# Patient Record
Sex: MALE | Race: WHITE
[De-identification: names, ages, dates, MRNs, and addresses within clinical notes are randomized per-mention and may not be internally consistent; named-entity substitution may affect disease eponyms.]

---

## 2018-03-09 ENCOUNTER — HOSPITAL ENCOUNTER (OUTPATIENT)
Dept: HOSPITAL 19 - COL.RAD | Age: 83
End: 2018-03-09
Payer: MEDICARE

## 2018-03-09 DIAGNOSIS — K80.20: ICD-10-CM

## 2018-03-09 DIAGNOSIS — K82.8: Primary | ICD-10-CM

## 2018-03-09 PROCEDURE — A9537 TC99M MEBROFENIN: HCPCS

## 2018-05-18 ENCOUNTER — HOSPITAL ENCOUNTER (EMERGENCY)
Dept: HOSPITAL 19 - COL.ER | Age: 83
Discharge: HOME | End: 2018-05-18
Payer: MEDICARE

## 2018-05-18 VITALS — HEART RATE: 85 BPM | DIASTOLIC BLOOD PRESSURE: 79 MMHG | SYSTOLIC BLOOD PRESSURE: 130 MMHG

## 2018-05-18 VITALS — TEMPERATURE: 96.9 F

## 2018-05-18 VITALS — HEIGHT: 70 IN | WEIGHT: 190.48 LBS | BODY MASS INDEX: 27.27 KG/M2

## 2018-05-18 DIAGNOSIS — E78.5: ICD-10-CM

## 2018-05-18 DIAGNOSIS — M54.5: ICD-10-CM

## 2018-05-18 DIAGNOSIS — G89.29: Primary | ICD-10-CM

## 2018-05-18 DIAGNOSIS — Z90.49: ICD-10-CM

## 2018-05-18 DIAGNOSIS — R10.31: ICD-10-CM

## 2018-05-18 DIAGNOSIS — R10.13: ICD-10-CM

## 2018-05-18 DIAGNOSIS — I10: ICD-10-CM

## 2018-05-18 LAB
ALBUMIN SERPL-MCNC: 3.7 GM/DL (ref 3.5–5)
ALP SERPL-CCNC: 96 U/L (ref 50–136)
ALT SERPL-CCNC: 40 U/L (ref 21–72)
AMYLASE SERPL-CCNC: 65 U/L (ref 30–110)
ANION GAP SERPL CALC-SCNC: 11 MMOL/L (ref 7–16)
AST SERPL-CCNC: 30 U/L (ref 15–37)
BASOPHILS # BLD: 0 10*3/UL (ref 0–0.2)
BASOPHILS NFR BLD AUTO: 0.3 % (ref 0–2)
BILIRUB SERPL-MCNC: 1.1 MG/DL (ref 0–1)
BUN SERPL-MCNC: 6 MG/DL (ref 9–20)
CALCIUM SERPL-MCNC: 9.9 MG/DL (ref 8.4–10.2)
CHLORIDE SERPL-SCNC: 100 MMOL/L (ref 98–107)
CO2 SERPL-SCNC: 25 MMOL/L (ref 22–30)
CREAT SERPL-SCNC: 0.92 MG/DL (ref 0.66–1.25)
CRP SERPL-MCNC: < 0.5 MG/DL (ref 0–0.9)
EOSINOPHIL # BLD: 0 10*3/UL (ref 0–0.7)
EOSINOPHIL NFR BLD: 0.3 % (ref 0–4)
ERYTHROCYTE [DISTWIDTH] IN BLOOD BY AUTOMATED COUNT: 14.1 % (ref 11.5–14.5)
GLUCOSE SERPL-MCNC: 102 MG/DL (ref 74–106)
GRANULOCYTES # BLD AUTO: 74.6 % (ref 42.2–75.2)
HCT VFR BLD AUTO: 42.9 % (ref 42–52)
HGB BLD-MCNC: 14.5 G/DL (ref 13.5–18)
INR BLD: 1 (ref 0.8–3)
LIPASE SERPL-CCNC: 73 U/L (ref 23–300)
LYMPHOCYTES # BLD: 1.1 10*3/UL (ref 1.2–3.4)
LYMPHOCYTES NFR BLD: 18.5 % (ref 20–51)
MCH RBC QN AUTO: 31 PG (ref 27–31)
MCHC RBC AUTO-ENTMCNC: 34 G/DL (ref 33–37)
MCV RBC AUTO: 92 FL (ref 80–100)
MONOCYTES # BLD: 0.4 10*3/UL (ref 0.1–0.6)
MONOCYTES NFR BLD AUTO: 6 % (ref 1.7–9.3)
NEUTROPHILS # BLD: 4.5 10*3/UL (ref 1.4–6.5)
PH UR STRIP.AUTO: 6 [PH] (ref 5–8)
PLATELET # BLD AUTO: 112 K/MM3 (ref 130–400)
PMV BLD AUTO: 11.5 FL (ref 7.4–10.4)
POTASSIUM SERPL-SCNC: 3.7 MMOL/L (ref 3.4–5)
PROT SERPL-MCNC: 7.2 GM/DL (ref 6.4–8.2)
PROTHROMBIN TIME: 11.5 SECONDS (ref 9.7–12.8)
RBC # BLD AUTO: 4.65 M/MM3 (ref 4.2–5.6)
RBC # UR: (no result) /HPF
SODIUM SERPL-SCNC: 137 MMOL/L (ref 137–145)
SP GR UR STRIP.AUTO: 1 (ref 1–1.03)
TROPONIN I SERPL-MCNC: < 0.012 NG/ML (ref 0–0.03)
URN COLLECT METHOD CLASS: (no result)

## 2021-12-03 ENCOUNTER — HOSPITAL ENCOUNTER (OUTPATIENT)
Dept: HOSPITAL 19 - SDCO | Age: 86
LOS: 1 days | Discharge: HOME | End: 2021-12-04
Attending: UROLOGY
Payer: MEDICARE

## 2021-12-03 VITALS — HEART RATE: 60 BPM | DIASTOLIC BLOOD PRESSURE: 66 MMHG | SYSTOLIC BLOOD PRESSURE: 142 MMHG

## 2021-12-03 VITALS — DIASTOLIC BLOOD PRESSURE: 58 MMHG | SYSTOLIC BLOOD PRESSURE: 129 MMHG | TEMPERATURE: 98.1 F | HEART RATE: 51 BPM

## 2021-12-03 VITALS — HEART RATE: 69 BPM | SYSTOLIC BLOOD PRESSURE: 123 MMHG | DIASTOLIC BLOOD PRESSURE: 71 MMHG

## 2021-12-03 VITALS — BODY MASS INDEX: 28.9 KG/M2 | HEIGHT: 69.02 IN | WEIGHT: 195.11 LBS

## 2021-12-03 VITALS — SYSTOLIC BLOOD PRESSURE: 132 MMHG | DIASTOLIC BLOOD PRESSURE: 70 MMHG | HEART RATE: 74 BPM

## 2021-12-03 VITALS — DIASTOLIC BLOOD PRESSURE: 67 MMHG | TEMPERATURE: 98.5 F | HEART RATE: 73 BPM | SYSTOLIC BLOOD PRESSURE: 118 MMHG

## 2021-12-03 VITALS — DIASTOLIC BLOOD PRESSURE: 64 MMHG | HEART RATE: 53 BPM | SYSTOLIC BLOOD PRESSURE: 140 MMHG

## 2021-12-03 VITALS — TEMPERATURE: 98.9 F | DIASTOLIC BLOOD PRESSURE: 64 MMHG | HEART RATE: 51 BPM | SYSTOLIC BLOOD PRESSURE: 119 MMHG

## 2021-12-03 VITALS — DIASTOLIC BLOOD PRESSURE: 84 MMHG | SYSTOLIC BLOOD PRESSURE: 132 MMHG | HEART RATE: 76 BPM

## 2021-12-03 VITALS — HEART RATE: 73 BPM | SYSTOLIC BLOOD PRESSURE: 128 MMHG | DIASTOLIC BLOOD PRESSURE: 82 MMHG | TEMPERATURE: 98.2 F

## 2021-12-03 VITALS — HEART RATE: 50 BPM | DIASTOLIC BLOOD PRESSURE: 62 MMHG | SYSTOLIC BLOOD PRESSURE: 128 MMHG

## 2021-12-03 DIAGNOSIS — C67.5: Primary | ICD-10-CM

## 2021-12-03 DIAGNOSIS — E87.6: ICD-10-CM

## 2021-12-03 DIAGNOSIS — F41.9: ICD-10-CM

## 2021-12-03 DIAGNOSIS — M10.9: ICD-10-CM

## 2021-12-03 DIAGNOSIS — Z92.3: ICD-10-CM

## 2021-12-03 DIAGNOSIS — I10: ICD-10-CM

## 2021-12-03 DIAGNOSIS — F32.A: ICD-10-CM

## 2021-12-03 DIAGNOSIS — Z85.46: ICD-10-CM

## 2021-12-03 DIAGNOSIS — G47.33: ICD-10-CM

## 2021-12-03 NOTE — NUR
PT IN BED, REPORTS PAIN TO TIP OF HIS PENIS. HS MEDS GIVEN INCLUDING MELATONIN
AND TYLENOL. HAS MORENO WITH CBI AT SLOW RATE, URINE PINK. IVF TO LEFT HAND, NO
REDNESS OR SWELLING NOTED.

## 2021-12-03 NOTE — NUR
PT ADMITTED TO FLOOR. NO COMPLAINTS OF DYSPNEA. COMPLAINS OF DISCOMFORT TO
PENILE AREA. WHEN QUESTIONED ABOUT MEDICATIONS PT STATES HE DOESN'T KNOW THEM
BUT GAVE A LIST TO SOMEONE IN SURGERY. SURGERY NURSE REPORTS MED REC
COMPLETE. SON UNABLE TO VERIFY MEDICATIONS. NO OTHER CONCERNS OR QUESTIONS AT
THIS TIME.

## 2021-12-04 VITALS — SYSTOLIC BLOOD PRESSURE: 110 MMHG | HEART RATE: 57 BPM | TEMPERATURE: 98.4 F | DIASTOLIC BLOOD PRESSURE: 61 MMHG

## 2021-12-04 VITALS — SYSTOLIC BLOOD PRESSURE: 103 MMHG | HEART RATE: 62 BPM | TEMPERATURE: 97.7 F | DIASTOLIC BLOOD PRESSURE: 53 MMHG

## 2021-12-04 VITALS — SYSTOLIC BLOOD PRESSURE: 119 MMHG | TEMPERATURE: 98 F | HEART RATE: 63 BPM | DIASTOLIC BLOOD PRESSURE: 60 MMHG

## 2021-12-04 NOTE — NUR
Patient resting in bed. His son at bedside.  rounded & plan of care
reviewed. AZO to be given to treat urinary symptoms. He has completed 3 of 6
cups. He tolerated breakfast. Hopeful for discharge home this afternoon.

## 2021-12-04 NOTE — NUR
PT REPORTS PAIN TO PENIS TIP, TYLENOL 650MG PO GIVEN. URINE LIGHT PINK WITH
CBI AT SLOW RATE. EDUCATED PT ON MORENO REMOVAL AT 0600.

## 2021-12-04 NOTE — NUR
patient voided a small amount, he is getting dressed, reports he is leaving
regaurdless. discussed with him the importance of completing 6 bottle routine.

## 2021-12-04 NOTE — NUR
6 BOTTLE ROUTINE COMPLETED. URINE CLEARING NICELY. PATIENT READY FOR
DISCHARGE. HIS SON AT BEDSIDE. PATIENT & SON GIVEN DISCHARGE EDUCATION.  WE
REVIEWED SIGNS & SYMPTOMS OF WHEN TO CALL THE DOCTOR. PATIENT GIVEN DIETARY &
ACTIVITY RESTRICTIONS. PATIENT AMBULATED OUT WITH ALL BELONGINGS. HIS SON
TAKING HIM HOME. THEY ARE AWARE THEY NEED TO MAKE FOLLOW UP APPT ON MONDAY.
DENY QUESTIONS OR CONCERNS. PATIENT JUST WANTING TO GET HOME.

## 2021-12-04 NOTE — NUR
URINE PINK, INSTILLED 250CC OF SALINE, DEFLATED BALLOON AND REMOVED CATHETER
WITHOUT PROBLEM. PT TOLERATED WELL.

## 2022-01-12 ENCOUNTER — HOSPITAL ENCOUNTER (INPATIENT)
Dept: HOSPITAL 19 - SDCO | Age: 87
LOS: 23 days | Discharge: NURSING, REHAB OR LONG-TERM CARE | DRG: 654 | End: 2022-02-04
Attending: UROLOGY | Admitting: UROLOGY
Payer: MEDICARE

## 2022-01-12 VITALS — BODY MASS INDEX: 31.9 KG/M2 | HEIGHT: 67.01 IN | WEIGHT: 203.27 LBS

## 2022-01-12 DIAGNOSIS — F03.91: ICD-10-CM

## 2022-01-12 DIAGNOSIS — E87.2: ICD-10-CM

## 2022-01-12 DIAGNOSIS — F05: ICD-10-CM

## 2022-01-12 DIAGNOSIS — C67.9: Primary | ICD-10-CM

## 2022-01-12 DIAGNOSIS — B96.20: ICD-10-CM

## 2022-01-12 DIAGNOSIS — F32.A: ICD-10-CM

## 2022-01-12 DIAGNOSIS — D69.6: ICD-10-CM

## 2022-01-12 DIAGNOSIS — E83.39: ICD-10-CM

## 2022-01-12 DIAGNOSIS — I10: ICD-10-CM

## 2022-01-12 DIAGNOSIS — D72.829: ICD-10-CM

## 2022-01-12 DIAGNOSIS — N17.9: ICD-10-CM

## 2022-01-12 DIAGNOSIS — N39.0: ICD-10-CM

## 2022-01-12 DIAGNOSIS — Z20.822: ICD-10-CM

## 2022-01-12 DIAGNOSIS — N40.0: ICD-10-CM

## 2022-01-12 DIAGNOSIS — G47.33: ICD-10-CM

## 2022-01-12 DIAGNOSIS — D64.9: ICD-10-CM

## 2022-01-12 DIAGNOSIS — Z96.653: ICD-10-CM

## 2022-01-12 DIAGNOSIS — B96.1: ICD-10-CM

## 2022-01-12 DIAGNOSIS — T40.605A: ICD-10-CM

## 2022-01-12 DIAGNOSIS — E87.6: ICD-10-CM

## 2022-01-12 DIAGNOSIS — M10.9: ICD-10-CM

## 2022-01-12 DIAGNOSIS — T41.45XA: ICD-10-CM

## 2022-01-12 DIAGNOSIS — R00.0: ICD-10-CM

## 2022-01-12 DIAGNOSIS — Z23: ICD-10-CM

## 2022-01-12 LAB
ANION GAP SERPL CALC-SCNC: 12 MMOL/L (ref 7–16)
BUN SERPL-MCNC: 16 MG/DL (ref 8–26)
CALCIUM SERPL-MCNC: 10.5 MG/DL (ref 8.4–10.2)
CHLORIDE SERPL-SCNC: 105 MMOL/L (ref 98–107)
CO2 SERPL-SCNC: 24 MMOL/L (ref 23–31)
CREAT SERPL-SCNC: 1.41 MG/DL (ref 0.72–1.25)
ERYTHROCYTE [DISTWIDTH] IN BLOOD BY AUTOMATED COUNT: 15.3 % (ref 11.5–14.5)
GLUCOSE SERPL-MCNC: 94 MG/DL (ref 70–99)
HCT VFR BLD AUTO: 40.4 % (ref 42–52)
HGB BLD-MCNC: 13.2 G/DL (ref 13.5–18)
MCH RBC QN AUTO: 31 PG (ref 27–31)
MCHC RBC AUTO-ENTMCNC: 33 G/DL (ref 33–37)
MCV RBC AUTO: 94 FL (ref 80–100)
PLATELET # BLD AUTO: 115 K/MM3 (ref 130–400)
PMV BLD AUTO: 11.2 FL (ref 7.4–10.4)
POTASSIUM SERPL-SCNC: 4 MMOL/L (ref 3.5–4.5)
RBC # BLD AUTO: 4.31 M/MM3 (ref 4.2–5.6)
SODIUM SERPL-SCNC: 141 MMOL/L (ref 136–145)

## 2022-01-12 PROCEDURE — P9016 RBC LEUKOCYTES REDUCED: HCPCS

## 2022-01-12 PROCEDURE — A9284 NON-ELECTRONIC SPIROMETER: HCPCS

## 2022-01-12 PROCEDURE — C2617 STENT, NON-COR, TEM W/O DEL: HCPCS

## 2022-01-26 LAB
ANION GAP SERPL CALC-SCNC: 9 MMOL/L (ref 7–16)
BUN SERPL-MCNC: 16 MG/DL (ref 8–26)
CALCIUM SERPL-MCNC: 10.2 MG/DL (ref 8.4–10.2)
CHLORIDE SERPL-SCNC: 106 MMOL/L (ref 98–107)
CO2 SERPL-SCNC: 25 MMOL/L (ref 23–31)
CREAT SERPL-SCNC: 1.58 MG/DL (ref 0.72–1.25)
ERYTHROCYTE [DISTWIDTH] IN BLOOD BY AUTOMATED COUNT: 15 % (ref 11.5–14.5)
GLUCOSE SERPL-MCNC: 69 MG/DL (ref 70–99)
HCT VFR BLD AUTO: 40.4 % (ref 42–52)
HGB BLD-MCNC: 13.4 G/DL (ref 13.5–18)
MCH RBC QN AUTO: 31 PG (ref 27–31)
MCHC RBC AUTO-ENTMCNC: 33 G/DL (ref 33–37)
MCV RBC AUTO: 92 FL (ref 80–100)
PLATELET # BLD AUTO: 124 K/MM3 (ref 130–400)
PMV BLD AUTO: 10.1 FL (ref 7.4–10.4)
POTASSIUM SERPL-SCNC: 3.9 MMOL/L (ref 3.5–4.5)
RBC # BLD AUTO: 4.38 M/MM3 (ref 4.2–5.6)
SODIUM SERPL-SCNC: 140 MMOL/L (ref 136–145)

## 2022-01-27 VITALS — OXYGEN SATURATION: 99 %

## 2022-01-27 VITALS — OXYGEN SATURATION: 100 %

## 2022-01-27 VITALS — OXYGEN SATURATION: 97 %

## 2022-01-27 VITALS — OXYGEN SATURATION: 81 %

## 2022-01-27 VITALS — OXYGEN SATURATION: 98 %

## 2022-01-27 VITALS
HEART RATE: 62 BPM | SYSTOLIC BLOOD PRESSURE: 114 MMHG | OXYGEN SATURATION: 96 % | DIASTOLIC BLOOD PRESSURE: 44 MMHG | TEMPERATURE: 97.8 F

## 2022-01-27 VITALS
TEMPERATURE: 97.5 F | OXYGEN SATURATION: 95 % | DIASTOLIC BLOOD PRESSURE: 65 MMHG | HEART RATE: 62 BPM | SYSTOLIC BLOOD PRESSURE: 88 MMHG

## 2022-01-27 VITALS
DIASTOLIC BLOOD PRESSURE: 41 MMHG | SYSTOLIC BLOOD PRESSURE: 115 MMHG | HEART RATE: 63 BPM | TEMPERATURE: 97.8 F | OXYGEN SATURATION: 100 %

## 2022-01-27 VITALS — OXYGEN SATURATION: 93 %

## 2022-01-27 VITALS — OXYGEN SATURATION: 84 %

## 2022-01-27 VITALS — OXYGEN SATURATION: 90 %

## 2022-01-27 VITALS — OXYGEN SATURATION: 95 %

## 2022-01-27 VITALS
DIASTOLIC BLOOD PRESSURE: 38 MMHG | SYSTOLIC BLOOD PRESSURE: 102 MMHG | OXYGEN SATURATION: 99 % | TEMPERATURE: 97.5 F | HEART RATE: 57 BPM

## 2022-01-27 VITALS — OXYGEN SATURATION: 92 %

## 2022-01-27 VITALS — OXYGEN SATURATION: 85 %

## 2022-01-27 VITALS — OXYGEN SATURATION: 91 %

## 2022-01-27 VITALS — TEMPERATURE: 97.4 F | SYSTOLIC BLOOD PRESSURE: 112 MMHG | DIASTOLIC BLOOD PRESSURE: 66 MMHG | HEART RATE: 75 BPM

## 2022-01-27 VITALS — OXYGEN SATURATION: 96 %

## 2022-01-27 VITALS — OXYGEN SATURATION: 82 %

## 2022-01-27 VITALS — OXYGEN SATURATION: 74 %

## 2022-01-27 VITALS — OXYGEN SATURATION: 88 %

## 2022-01-27 VITALS — OXYGEN SATURATION: 94 %

## 2022-01-27 VITALS — OXYGEN SATURATION: 86 %

## 2022-01-27 VITALS — OXYGEN SATURATION: 83 %

## 2022-01-27 VITALS — OXYGEN SATURATION: 87 %

## 2022-01-27 VITALS
DIASTOLIC BLOOD PRESSURE: 37 MMHG | SYSTOLIC BLOOD PRESSURE: 99 MMHG | OXYGEN SATURATION: 99 % | HEART RATE: 55 BPM | TEMPERATURE: 97.5 F

## 2022-01-27 VITALS — DIASTOLIC BLOOD PRESSURE: 62 MMHG | HEART RATE: 59 BPM | SYSTOLIC BLOOD PRESSURE: 106 MMHG | TEMPERATURE: 97.5 F

## 2022-01-27 VITALS — DIASTOLIC BLOOD PRESSURE: 60 MMHG | SYSTOLIC BLOOD PRESSURE: 108 MMHG | HEART RATE: 68 BPM | TEMPERATURE: 97.8 F

## 2022-01-27 VITALS — OXYGEN SATURATION: 89 %

## 2022-01-27 VITALS — OXYGEN SATURATION: 40 %

## 2022-01-27 VITALS
SYSTOLIC BLOOD PRESSURE: 111 MMHG | DIASTOLIC BLOOD PRESSURE: 47 MMHG | HEART RATE: 59 BPM | TEMPERATURE: 97.8 F | OXYGEN SATURATION: 99 %

## 2022-01-27 VITALS — OXYGEN SATURATION: 78 %

## 2022-01-27 VITALS — OXYGEN SATURATION: 75 %

## 2022-01-27 LAB
ANION GAP SERPL CALC-SCNC: 7 MMOL/L (ref 7–16)
BASOPHILS # BLD: 0 K/MM3 (ref 0–0.2)
BASOPHILS NFR BLD AUTO: 0.3 % (ref 0–2)
BUN SERPL-MCNC: 14 MG/DL (ref 8–26)
CALCIUM SERPL-MCNC: 8.3 MG/DL (ref 8.4–10.2)
CHLORIDE SERPL-SCNC: 112 MMOL/L (ref 98–107)
CO2 SERPL-SCNC: 20 MMOL/L (ref 23–31)
CREAT SERPL-SCNC: 1.34 MG/DL (ref 0.72–1.25)
EOSINOPHIL # BLD: 0 K/MM3 (ref 0–0.7)
EOSINOPHIL NFR BLD: 0.1 % (ref 0–4)
ERYTHROCYTE [DISTWIDTH] IN BLOOD BY AUTOMATED COUNT: 16.5 % (ref 11.5–14.5)
GLUCOSE SERPL-MCNC: 215 MG/DL (ref 70–99)
GRANULOCYTES # BLD AUTO: 86.6 % (ref 42.2–75.2)
HCT VFR BLD AUTO: 30 % (ref 42–52)
HCT VFR BLD AUTO: 36 % (ref 42–52)
HGB BLD-MCNC: 11.5 G/DL (ref 13.5–18)
HGB BLD-MCNC: 9.8 G/DL (ref 13.5–18)
LYMPHOCYTES # BLD: 1.4 K/MM3 (ref 1.2–3.4)
LYMPHOCYTES NFR BLD: 8.9 % (ref 20–51)
MAGNESIUM SERPL-MCNC: 1.8 MG/DL (ref 1.6–2.6)
MCH RBC QN AUTO: 30 PG (ref 27–31)
MCHC RBC AUTO-ENTMCNC: 32 G/DL (ref 33–37)
MCV RBC AUTO: 94 FL (ref 80–100)
MONOCYTES # BLD: 0.5 K/MM3 (ref 0.1–0.6)
MONOCYTES NFR BLD AUTO: 3 % (ref 1.7–9.3)
NEUTROPHILS # BLD: 13.2 K/MM3 (ref 1.4–6.5)
PHOSPHATE SERPL-MCNC: 4.2 MG/DL (ref 2.3–4.7)
PLATELET # BLD AUTO: 95 K/MM3 (ref 130–400)
PMV BLD AUTO: 11.1 FL (ref 7.4–10.4)
POTASSIUM SERPL-SCNC: 4.4 MMOL/L (ref 3.5–4.5)
RBC # BLD AUTO: 3.84 M/MM3 (ref 4.2–5.6)
SODIUM SERPL-SCNC: 139 MMOL/L (ref 136–145)

## 2022-01-27 PROCEDURE — 0VT00ZZ RESECTION OF PROSTATE, OPEN APPROACH: ICD-10-PCS | Performed by: UROLOGY

## 2022-01-27 PROCEDURE — 0T180ZC BYPASS BILATERAL URETERS TO ILEOCUTANEOUS, OPEN APPROACH: ICD-10-PCS | Performed by: UROLOGY

## 2022-01-27 PROCEDURE — 0TTB0ZZ RESECTION OF BLADDER, OPEN APPROACH: ICD-10-PCS | Performed by: UROLOGY

## 2022-01-27 NOTE — NUR
Patient resting quietly in bed. Patient's son, German, at bedside. Patient
arouses easily to speech; he is oriented times four and follows verbal
commands. He reports pain in lower abdomen as 9/10. Epidural in place and
infusing; patient instructed on use of PCA pump.
 
Ileal conduit in place to the RLQ of abdomen to dependent drainage. Drainage
is blood-tinged and cloudy. Alva catheter in place with scant dark red
drainage. A drain in place to the LLQ set to low-intermittent suction with
umesh bloody drainage. Per RIYA Baez, Dr. Lyons is aware of all drainage
colors and amounts.

## 2022-01-27 NOTE — NUR
PT AMBULATED WITHOUT DIFFICULTIES TO BAY 8.  VS OBTAINED.  VSS.  LUNGS CTA. BS
PRESENT.  IV STARTED IN R HAND WITH 20G.  LR INFUSING.  PT ORIENTED TO ROOM
AND CALL LIGHT.  PT VERBALIZED UNDERSTANDING.  SON AT BEDSIDE.  ALL QUESTIONS
ANSWERED.  WILL CONTINUE TO MONITOR PT.

## 2022-01-27 NOTE — NUR
Discussed plan of care with Dr. Lyons. Clarified that he wants fluids with
potassium despite latest BMP showing potassium level of 4.4.  Will continue
these fluids per Dr. Lyons.  Will keep drain to LIS and aim to maintain MAPs
at least 65. Discussed patient's mental status; alert but confused. Family at
bedside at this time.

## 2022-01-27 NOTE — NUR
Patient awake and alert but oriented to self only.  States multiple times
"where am I?" and "I'm confused", however, patient is cooperative with staff
members.  Nurse attempted to re-orient. Will continue to monitor.

## 2022-01-27 NOTE — NUR
Dr. Lyons at bedside to see patient. Observed umesh bloody output from drain
and owen and urostomy.  Dr. Lyons stated that everything looked "good" and
had no concerns about drainage. Blood was noted to have saturated a section of
the abdominal dressing near the incision site. This was showed to Dr. Lyons
and instructed to just re-enforce dressing. Will continue to monitor.

## 2022-01-28 VITALS — OXYGEN SATURATION: 99 %

## 2022-01-28 VITALS — OXYGEN SATURATION: 96 %

## 2022-01-28 VITALS — OXYGEN SATURATION: 100 %

## 2022-01-28 VITALS — OXYGEN SATURATION: 91 %

## 2022-01-28 VITALS — OXYGEN SATURATION: 98 %

## 2022-01-28 VITALS — OXYGEN SATURATION: 97 %

## 2022-01-28 VITALS — SYSTOLIC BLOOD PRESSURE: 125 MMHG | DIASTOLIC BLOOD PRESSURE: 48 MMHG | HEART RATE: 65 BPM | TEMPERATURE: 97.6 F

## 2022-01-28 VITALS
HEART RATE: 93 BPM | TEMPERATURE: 98.6 F | DIASTOLIC BLOOD PRESSURE: 67 MMHG | OXYGEN SATURATION: 95 % | SYSTOLIC BLOOD PRESSURE: 147 MMHG

## 2022-01-28 VITALS — OXYGEN SATURATION: 93 %

## 2022-01-28 VITALS — OXYGEN SATURATION: 94 %

## 2022-01-28 VITALS — OXYGEN SATURATION: 87 %

## 2022-01-28 VITALS — OXYGEN SATURATION: 99 % | SYSTOLIC BLOOD PRESSURE: 119 MMHG | HEART RATE: 58 BPM | DIASTOLIC BLOOD PRESSURE: 46 MMHG

## 2022-01-28 VITALS — OXYGEN SATURATION: 95 %

## 2022-01-28 VITALS — OXYGEN SATURATION: 88 %

## 2022-01-28 VITALS — DIASTOLIC BLOOD PRESSURE: 55 MMHG | SYSTOLIC BLOOD PRESSURE: 104 MMHG | TEMPERATURE: 97.9 F | HEART RATE: 67 BPM

## 2022-01-28 VITALS — OXYGEN SATURATION: 92 %

## 2022-01-28 VITALS — OXYGEN SATURATION: 84 %

## 2022-01-28 VITALS — OXYGEN SATURATION: 90 %

## 2022-01-28 VITALS
TEMPERATURE: 97.9 F | SYSTOLIC BLOOD PRESSURE: 107 MMHG | DIASTOLIC BLOOD PRESSURE: 41 MMHG | HEART RATE: 57 BPM | OXYGEN SATURATION: 100 %

## 2022-01-28 VITALS — OXYGEN SATURATION: 85 %

## 2022-01-28 VITALS — OXYGEN SATURATION: 83 %

## 2022-01-28 VITALS — OXYGEN SATURATION: 89 %

## 2022-01-28 VITALS — OXYGEN SATURATION: 80 %

## 2022-01-28 VITALS — SYSTOLIC BLOOD PRESSURE: 118 MMHG | HEART RATE: 96 BPM | DIASTOLIC BLOOD PRESSURE: 63 MMHG | TEMPERATURE: 97.7 F

## 2022-01-28 VITALS — OXYGEN SATURATION: 79 %

## 2022-01-28 VITALS — OXYGEN SATURATION: 74 %

## 2022-01-28 VITALS — OXYGEN SATURATION: 86 %

## 2022-01-28 VITALS — OXYGEN SATURATION: 73 %

## 2022-01-28 VITALS — OXYGEN SATURATION: 77 %

## 2022-01-28 LAB
ANION GAP SERPL CALC-SCNC: 5 MMOL/L (ref 7–16)
BASOPHILS # BLD: 0 K/MM3 (ref 0–0.2)
BASOPHILS NFR BLD AUTO: 0.1 % (ref 0–2)
BUN SERPL-MCNC: 19 MG/DL (ref 8–26)
CALCIUM SERPL-MCNC: 8 MG/DL (ref 8.4–10.2)
CHLORIDE SERPL-SCNC: 112 MMOL/L (ref 98–107)
CO2 SERPL-SCNC: 19 MMOL/L (ref 23–31)
CREAT SERPL-SCNC: 1.21 MG/DL (ref 0.72–1.25)
EOSINOPHIL # BLD: 0 K/MM3 (ref 0–0.7)
EOSINOPHIL NFR BLD: 0 % (ref 0–4)
ERYTHROCYTE [DISTWIDTH] IN BLOOD BY AUTOMATED COUNT: 16.6 % (ref 11.5–14.5)
GLUCOSE SERPL-MCNC: 151 MG/DL (ref 70–99)
GRANULOCYTES # BLD AUTO: 86.6 % (ref 42.2–75.2)
HCT VFR BLD AUTO: 27.1 % (ref 42–52)
HCT VFR BLD AUTO: 28.2 % (ref 42–52)
HGB BLD-MCNC: 8.7 G/DL (ref 13.5–18)
HGB BLD-MCNC: 9.1 G/DL (ref 13.5–18)
LYMPHOCYTES # BLD: 0.7 K/MM3 (ref 1.2–3.4)
LYMPHOCYTES NFR BLD: 4.9 % (ref 20–51)
MAGNESIUM SERPL-MCNC: 1.8 MG/DL (ref 1.6–2.6)
MCH RBC QN AUTO: 30 PG (ref 27–31)
MCHC RBC AUTO-ENTMCNC: 32 G/DL (ref 33–37)
MCV RBC AUTO: 92 FL (ref 80–100)
MONOCYTES # BLD: 1.1 K/MM3 (ref 0.1–0.6)
MONOCYTES NFR BLD AUTO: 7.8 % (ref 1.7–9.3)
NEUTROPHILS # BLD: 12.5 K/MM3 (ref 1.4–6.5)
PHOSPHATE SERPL-MCNC: 1.7 MG/DL (ref 2.3–4.7)
PLATELET # BLD AUTO: 75 K/MM3 (ref 130–400)
PMV BLD AUTO: 11.2 FL (ref 7.4–10.4)
POTASSIUM SERPL-SCNC: 4.8 MMOL/L (ref 3.5–4.5)
RBC # BLD AUTO: 2.94 M/MM3 (ref 4.2–5.6)
SODIUM SERPL-SCNC: 136 MMOL/L (ref 136–145)

## 2022-01-28 NOTE — NUR
Initial visit attempt; Patient sleeping,  left a prayer card letting
patient know of the availability of spiritual care at our hospital.

## 2022-01-28 NOTE — NUR
Dr. Lyons in to see patiet. Potassium discussed. IVF orders changed. HGB
discussed and to be rechecked at noon. PT to stay in ICU for one more night
for post-op monitoring.

## 2022-01-28 NOTE — NUR
Patient resting quietly in bed. Patient is alert and answers 3/4 orientation
questions appropriately, however, some confusing is evident when speaking to
patient. He reports mild pain in abdominal area. Education reinforced
regarding use of PCA pump. All vitals within normal limits.

## 2022-01-28 NOTE — NUR
PT shift assessment completed this am. PT is pleasantly confused. PT does well
with re-orientation. Vitals obtained. Lines, drains, medications, lab and
orders reviewed. PT denies pain.
PT verbalizes understanding of call light and will call for
assistance if needed.

## 2022-01-28 NOTE — NUR
met with patient and son, German and also with spouse (via face
time) to complete initial intake.  Patient lives with spouse in [place], [place] and has been independent with all of his activities of daily living.
Patient plans to return home and home health is desired for continued
education and assessment of urinary catheter placed.  Patient's primary care
provider is Reinaldo Byers with [facility].  Per patient and German, worker gave
[facility] a referral and faxed clinical information.  Worker spoke
with Reinaldo Byers's nurseAmy Fraga regarding the above information and
confimed that they would support home health for skilled nursing and physical
therapy.  Worker provided written and verbal information on advance directives
and spouse is looking for their copies at home.  Worker collaborated with
patient's nurse to obtain orders for physical therapy in the hospital.
 
 
Discharge home with spouse and [facility] for skilled nursing and
physical therapy.

## 2022-01-29 VITALS — OXYGEN SATURATION: 97 %

## 2022-01-29 VITALS — OXYGEN SATURATION: 98 %

## 2022-01-29 VITALS — OXYGEN SATURATION: 94 %

## 2022-01-29 VITALS — OXYGEN SATURATION: 95 %

## 2022-01-29 VITALS — OXYGEN SATURATION: 99 %

## 2022-01-29 VITALS — OXYGEN SATURATION: 96 %

## 2022-01-29 VITALS — OXYGEN SATURATION: 93 %

## 2022-01-29 VITALS — OXYGEN SATURATION: 92 %

## 2022-01-29 VITALS — TEMPERATURE: 98.7 F | DIASTOLIC BLOOD PRESSURE: 71 MMHG | HEART RATE: 113 BPM | SYSTOLIC BLOOD PRESSURE: 120 MMHG

## 2022-01-29 VITALS — OXYGEN SATURATION: 91 %

## 2022-01-29 VITALS — OXYGEN SATURATION: 79 %

## 2022-01-29 VITALS — OXYGEN SATURATION: 100 %

## 2022-01-29 VITALS — OXYGEN SATURATION: 89 %

## 2022-01-29 VITALS — OXYGEN SATURATION: 81 %

## 2022-01-29 VITALS — OXYGEN SATURATION: 85 %

## 2022-01-29 VITALS — OXYGEN SATURATION: 90 %

## 2022-01-29 VITALS — OXYGEN SATURATION: 88 %

## 2022-01-29 VITALS — OXYGEN SATURATION: 86 %

## 2022-01-29 VITALS — OXYGEN SATURATION: 83 %

## 2022-01-29 VITALS — DIASTOLIC BLOOD PRESSURE: 66 MMHG | HEART RATE: 84 BPM | SYSTOLIC BLOOD PRESSURE: 110 MMHG | TEMPERATURE: 98.3 F

## 2022-01-29 VITALS — DIASTOLIC BLOOD PRESSURE: 84 MMHG | TEMPERATURE: 98.9 F | HEART RATE: 92 BPM | SYSTOLIC BLOOD PRESSURE: 105 MMHG

## 2022-01-29 VITALS — OXYGEN SATURATION: 77 %

## 2022-01-29 VITALS
HEART RATE: 101 BPM | DIASTOLIC BLOOD PRESSURE: 64 MMHG | SYSTOLIC BLOOD PRESSURE: 130 MMHG | TEMPERATURE: 98.4 F | OXYGEN SATURATION: 96 %

## 2022-01-29 VITALS — HEART RATE: 104 BPM | DIASTOLIC BLOOD PRESSURE: 71 MMHG | SYSTOLIC BLOOD PRESSURE: 119 MMHG | TEMPERATURE: 98.8 F

## 2022-01-29 VITALS — OXYGEN SATURATION: 87 %

## 2022-01-29 VITALS — OXYGEN SATURATION: 75 %

## 2022-01-29 VITALS — OXYGEN SATURATION: 84 %

## 2022-01-29 LAB
ANION GAP SERPL CALC-SCNC: 6 MMOL/L (ref 7–16)
BASOPHILS # BLD: 0 K/MM3 (ref 0–0.2)
BASOPHILS NFR BLD AUTO: 0.2 % (ref 0–2)
BUN SERPL-MCNC: 13 MG/DL (ref 8–26)
CALCIUM SERPL-MCNC: 8.8 MG/DL (ref 8.4–10.2)
CHLORIDE SERPL-SCNC: 112 MMOL/L (ref 98–107)
CO2 SERPL-SCNC: 19 MMOL/L (ref 23–31)
CREAT SERPL-SCNC: 1.13 MG/DL (ref 0.72–1.25)
EOSINOPHIL # BLD: 0 K/MM3 (ref 0–0.7)
EOSINOPHIL NFR BLD: 0.1 % (ref 0–4)
ERYTHROCYTE [DISTWIDTH] IN BLOOD BY AUTOMATED COUNT: 16.8 % (ref 11.5–14.5)
GLUCOSE SERPL-MCNC: 120 MG/DL (ref 70–99)
GRANULOCYTES # BLD AUTO: 82.1 % (ref 42.2–75.2)
HCT VFR BLD AUTO: 26.6 % (ref 42–52)
HGB BLD-MCNC: 8.6 G/DL (ref 13.5–18)
IRON SERPL-MCNC: 24 UG/DL (ref 50–175)
LYMPHOCYTES # BLD: 1 K/MM3 (ref 1.2–3.4)
LYMPHOCYTES NFR BLD: 8.4 % (ref 20–51)
MCH RBC QN AUTO: 29 PG (ref 27–31)
MCHC RBC AUTO-ENTMCNC: 32 G/DL (ref 33–37)
MCV RBC AUTO: 91 FL (ref 80–100)
MONOCYTES # BLD: 1.1 K/MM3 (ref 0.1–0.6)
MONOCYTES NFR BLD AUTO: 8.8 % (ref 1.7–9.3)
NEUTROPHILS # BLD: 10 K/MM3 (ref 1.4–6.5)
PLATELET # BLD AUTO: 83 K/MM3 (ref 130–400)
PMV BLD AUTO: 12.2 FL (ref 7.4–10.4)
POTASSIUM SERPL-SCNC: 4 MMOL/L (ref 3.5–4.5)
RBC # BLD AUTO: 2.94 M/MM3 (ref 4.2–5.6)
RETICS #: 0.06 M/MM3 (ref 0.02–0.16)
RETICS/RBC NFR AUTO: 2.2 % (ref 0.5–3.52)
SODIUM SERPL-SCNC: 137 MMOL/L (ref 136–145)

## 2022-01-29 NOTE — NUR
THIS NURSE ENTERED PT'S ROOM TO ASSESS NEEDS. IT WAS FOUND THAT PT HAD
REMOVED UROSTOMY POUCH, DRAIN POUCH, AND INCISION DRESSING. POUCHES AND
DRESSING WERE REPLACED. PT WAS WIPED DOWN WITH BATH WIPES AND LINENS AND GOWN
WERE CHANGED AS WELL. PT DID BECOME AGITATED DURING DRESSING AND LINEN CHANGE,
BUT WAS ABLE TO CALM DOWN ONCE FINISHED. PT NOW LIES SUPINE WITH HOB ELEVATED
AND BED ALARM ON.

## 2022-01-29 NOTE — NUR
UPON ASSESSING PT IT WAS FOUND THAT THE CB DRAIN POUCH AND ILEAL CONDUIT
POUCH HAD BOTH BEEN REMOVED BY PT. DRESSING WAS REPLACED OVER MIDLINE
INCISION AND BOTH DRAIN POUCHES WERE REPLACED. PT CONTINUES TO BE AGITATED AND
RESTLESS.

## 2022-01-29 NOTE — NUR
PT AGITATED AND RESTLESS DESPITE REPOSITING AND REASSURANCE FROM WIFE; PT
STATES HE "HURTS ALL OVER." PRN DILAUDID GIVEN FOR PAIN.

## 2022-01-29 NOTE — NUR
REPORT RECEIVED FROM RIYA ALLAN. FC TO DEPENDENT DRAINAGE WITH SANGINOUS
DRAINAGE PRESENT. ABDOMINAL DRAIN TO LIS WITH SANGINOUS DRAINAGE PRESENT.
ILEAL CONDUIT TO DEPENDENT DRAINAGE WITH DARK YELLIOW URINE PRESENT. RIGHT
RADIAL ARTERIAL LINE IN PLACE; CLEAN, DRY, AND INTACT. 20G IV IN RIGHT HAND
AND 20G IV TO RIGHT EJ. ABDOMINAL INCISION COVERED WITH ABD; CLEAN AND DRY
WITH NO DRAINAGE PRESENT ON DRESSING. VITAL SIGNS STABLE.

## 2022-01-29 NOTE — NUR
Patient exhibiting increasing confusion and restlessness throughout shift. Dr. Lyons notified at approximately 0215 of patient's behaviors, including
picking at incision dressing and ostomy site, potentially dislodging ileal
conduits. Patient reports unreleived pain despite epidural and PCA use. No
new orders were received at that time. Mitts applied to protect
dressings and lines. With mitt application, patient became agitated.
Anesthesia provider, Jewel Ritter notified at 0315. Epidural insertion site
dressing had previously been noted to be peeling, and was reinforced by this
RN. With that in mind, anesthesia suspects epidural may have become dislodged.
Suggests epidural be stopped and patient switched to IV pain control to be
determined by Dr. Lyons. This RN proceeded to contact Dr. Lyons a second
time, relaying conversation with anesthesia. Dr. Lyons agrees with
anesthesia; to stop epidural infusion and switch to either IV Dilaudid or
Morphine, with no preference to either. This RN then contacted anesthesia
provider, Jewel Ritter, once more to discuss pain relief options. Eventually, it
was decided to initiate IV Dilaudid, 0.25mg q 2 hours to start. Will
administer and reassess pain management as needed.

## 2022-01-29 NOTE — NUR
Epidural discontinued by anesthesia. Orders received to remove epidural
catheter. Catheter removed by this RN; tip intact. No bleeding noted following
removal, bandaid applied to insertion site.

## 2022-01-29 NOTE — NUR
RIGHT RADIAL ARTERIAL LINE REMOVED BY THIS NURSE. ARTERIAL LINE CATHETER
REMOVED INTACT. PRESSURE HELD TO SITE UNTIL HEMOSTASIS WAS ACHEIVED. DRESSING
PLACED OVER SITE. PT TOLERATED WITHOUT COMPLAINT.

## 2022-01-29 NOTE — NUR
PT'S SON DORIE AT THIS BEDSIDE. PT'S SON ASKS THIS NURSE IF PT HAS BEEN TAKING
HIS SEIZURE MEDICATION. THIS NURSE LET DORIE KNOW THAT STAFF WAS UNAWARE THAT PT
HAS A HISTORY OF SEIZURES AND THAT HE TAKES MEDICATION. DORIE ALSO MADE THIS
NURSE AWARE THAT PT HAS A HISTORY OF STROKE AND THAT WHEN THE STROKE OCCURED;
THE PT'S CURRENT CONDITION/BEHAVIOR WAS SIMILAR TO HOW IT IS NOW. THIS NURSE
MADE HOSPITALIST AWARE OF THESE NOTIFICATIONS FROM SON.

## 2022-01-29 NOTE — NUR
ICU CHARGE NURSE CALLED REGAURDING PATIENT DRAIN, THIS NURSE DID CALL
 TO CLARIFY ORDERS. HE REQUESTED CB DRAIN BE CUT & BAGGED.
LLQ CB DRAIN CUT & PLACED TO DD IN A OSTOMY BAG. WHILE AT BEDSIDE
ASSISTED PATIENT PRIMARY NURSE WITH STOMA CARES. STENTS FLUSHED WITH 3MLS NS
EACH. NEW OSTOMY APPLIANCE PLACED, PRIOR BAG NOTED TO BE LEAKING. CUT OSTOMY
APPLIACE TO FIT. STOMA PINK. MIDLINE STAPLES INTACT. NEW GAUZE DRESING PLACED,
DRAINGE NOTED. PATIENT WIFE AT BEDSIDE & QUESTIONS ANSWERED REGUARDING STOMA
CARES.

## 2022-01-30 VITALS — OXYGEN SATURATION: 94 %

## 2022-01-30 VITALS — OXYGEN SATURATION: 95 %

## 2022-01-30 VITALS — OXYGEN SATURATION: 93 %

## 2022-01-30 VITALS — OXYGEN SATURATION: 97 %

## 2022-01-30 VITALS — TEMPERATURE: 97.7 F | HEART RATE: 110 BPM | DIASTOLIC BLOOD PRESSURE: 93 MMHG | SYSTOLIC BLOOD PRESSURE: 144 MMHG

## 2022-01-30 VITALS — OXYGEN SATURATION: 96 %

## 2022-01-30 VITALS
OXYGEN SATURATION: 97 % | DIASTOLIC BLOOD PRESSURE: 71 MMHG | HEART RATE: 92 BPM | TEMPERATURE: 99.1 F | SYSTOLIC BLOOD PRESSURE: 127 MMHG

## 2022-01-30 VITALS — OXYGEN SATURATION: 98 %

## 2022-01-30 VITALS — OXYGEN SATURATION: 90 %

## 2022-01-30 VITALS — HEART RATE: 104 BPM | SYSTOLIC BLOOD PRESSURE: 116 MMHG | DIASTOLIC BLOOD PRESSURE: 69 MMHG | TEMPERATURE: 99.4 F

## 2022-01-30 VITALS — OXYGEN SATURATION: 87 %

## 2022-01-30 VITALS — OXYGEN SATURATION: 89 %

## 2022-01-30 VITALS — OXYGEN SATURATION: 99 %

## 2022-01-30 VITALS — OXYGEN SATURATION: 88 %

## 2022-01-30 VITALS — OXYGEN SATURATION: 92 %

## 2022-01-30 VITALS — OXYGEN SATURATION: 100 %

## 2022-01-30 VITALS — SYSTOLIC BLOOD PRESSURE: 123 MMHG | TEMPERATURE: 98.6 F | DIASTOLIC BLOOD PRESSURE: 65 MMHG | HEART RATE: 95 BPM

## 2022-01-30 VITALS — OXYGEN SATURATION: 86 %

## 2022-01-30 VITALS — HEART RATE: 104 BPM | DIASTOLIC BLOOD PRESSURE: 72 MMHG | SYSTOLIC BLOOD PRESSURE: 154 MMHG | TEMPERATURE: 99 F

## 2022-01-30 VITALS — OXYGEN SATURATION: 91 %

## 2022-01-30 VITALS — HEART RATE: 89 BPM | DIASTOLIC BLOOD PRESSURE: 70 MMHG | SYSTOLIC BLOOD PRESSURE: 116 MMHG | TEMPERATURE: 100.2 F

## 2022-01-30 VITALS — OXYGEN SATURATION: 68 %

## 2022-01-30 VITALS — OXYGEN SATURATION: 82 %

## 2022-01-30 VITALS — OXYGEN SATURATION: 60 %

## 2022-01-30 VITALS
OXYGEN SATURATION: 98 % | DIASTOLIC BLOOD PRESSURE: 69 MMHG | TEMPERATURE: 98 F | SYSTOLIC BLOOD PRESSURE: 136 MMHG | HEART RATE: 98 BPM

## 2022-01-30 VITALS — OXYGEN SATURATION: 64 %

## 2022-01-30 VITALS — OXYGEN SATURATION: 83 %

## 2022-01-30 VITALS — OXYGEN SATURATION: 85 %

## 2022-01-30 VITALS — OXYGEN SATURATION: 65 %

## 2022-01-30 VITALS — OXYGEN SATURATION: 75 %

## 2022-01-30 VITALS — OXYGEN SATURATION: 84 %

## 2022-01-30 VITALS — OXYGEN SATURATION: 72 %

## 2022-01-30 VITALS — OXYGEN SATURATION: 80 %

## 2022-01-30 VITALS — OXYGEN SATURATION: 79 %

## 2022-01-30 LAB
ALBUMIN SERPL-MCNC: 2.7 GM/DL (ref 3.4–4.8)
ALP SERPL-CCNC: 57 U/L (ref 40–150)
ALT SERPL-CCNC: 9 U/L (ref 0–55)
ANION GAP SERPL CALC-SCNC: 6 MMOL/L (ref 7–16)
AST SERPL-CCNC: 24 U/L (ref 5–34)
BASOPHILS # BLD: 0 K/MM3 (ref 0–0.2)
BASOPHILS NFR BLD AUTO: 0.4 % (ref 0–2)
BILIRUB SERPL-MCNC: 1.2 MG/DL (ref 0.2–1.2)
BUN SERPL-MCNC: 8 MG/DL (ref 8–26)
CALCIUM SERPL-MCNC: 8.7 MG/DL (ref 8.4–10.2)
CHLORIDE SERPL-SCNC: 109 MMOL/L (ref 98–107)
CO2 SERPL-SCNC: 24 MMOL/L (ref 23–31)
CREAT SERPL-SCNC: 1.08 MG/DL (ref 0.72–1.25)
EOSINOPHIL # BLD: 0.1 K/MM3 (ref 0–0.7)
EOSINOPHIL NFR BLD: 1.3 % (ref 0–4)
ERYTHROCYTE [DISTWIDTH] IN BLOOD BY AUTOMATED COUNT: 16.1 % (ref 11.5–14.5)
GLUCOSE SERPL-MCNC: 93 MG/DL (ref 70–99)
GRANULOCYTES # BLD AUTO: 68.1 % (ref 42.2–75.2)
HCT VFR BLD AUTO: 25.7 % (ref 42–52)
HGB BLD-MCNC: 8.5 G/DL (ref 13.5–18)
LYMPHOCYTES # BLD: 0.8 K/MM3 (ref 1.2–3.4)
LYMPHOCYTES NFR BLD: 14.4 % (ref 20–51)
MCH RBC QN AUTO: 30 PG (ref 27–31)
MCHC RBC AUTO-ENTMCNC: 33 G/DL (ref 33–37)
MCV RBC AUTO: 90 FL (ref 80–100)
MONOCYTES # BLD: 0.8 K/MM3 (ref 0.1–0.6)
MONOCYTES NFR BLD AUTO: 15.4 % (ref 1.7–9.3)
NEUTROPHILS # BLD: 3.6 K/MM3 (ref 1.4–6.5)
PHOSPHATE SERPL-MCNC: 2.1 MG/DL (ref 2.3–4.7)
PLATELET # BLD AUTO: 67 K/MM3 (ref 130–400)
PMV BLD AUTO: 11.5 FL (ref 7.4–10.4)
POTASSIUM SERPL-SCNC: 3.4 MMOL/L (ref 3.5–4.5)
PROT SERPL-MCNC: 5.3 GM/DL (ref 6.2–8.1)
RBC # BLD AUTO: 2.87 M/MM3 (ref 4.2–5.6)
SODIUM SERPL-SCNC: 139 MMOL/L (ref 136–145)

## 2022-01-30 NOTE — NUR
PATIENT ADMITED INTO ROOM 326 FROM ICU. PATIENT IS CONFUSED AND DROWSY. ICU
REPORTS PATIENT HAS BEEN STRUGGLING WITH POST OP DELIRIUM AND HAS REQUIRED
ATIVAN. PATIENT ALSO HAS PRN DILAUDID AND HALDOL IF NEEDED. PATIENT IS
CURRENTLY UNABLE TO SAFELY TAKE ORAL MEDICATIONS. PATIENT RESPONDS TO STIMULI
BUT OFTEN BECOMES AGGITATED. BUE MITTS INPLACE. BED ALARM ON. RIGHT UPPER ARM
IV INFUSING VIA PUMP. ABD IS DISTENDED WITH POSTIVE BOWL SOUNDS. ABD MIDLINE
INCISION IS WELL APPROXIMATED WITH STAPLE CLOSURE. RIGHT ABD WITH ILEO CONDUIT
TO DD, NOTED MOD AMOUNTS OF RED-TINGED URINE. MORENO TO DD WITH SMALL AMOUNTS
OF RED-TINGED URINE. CB DRAIN CUT & BAG WITH SMALL AMOUNTS OF RED-TINGED
DRAINAGE. HEAD TO TOE ASSESSMENT COMPLETE. PT/OT/ST CONSULTED. SON AT BEDSIDE.
CALL LIGHT IN REACH. BED ALARM ON. PATIENT VISUALIZED FROM NURSING STATION.

## 2022-01-30 NOTE — NUR
PATIENT BECOMING RESTLESS AND AGGITATED AGAIN. PATIENT APPEARS TO HAVE SOME
DISCOMFORT, GAVE PRN IV DILAUDID. WILL MONITOR.

## 2022-01-30 NOTE — NUR
PATIENT SLEEPING WITH MOUTH OPEN. NOTED THICK ORAL SECRETIONS. USED PRN
SUCTION. PATIENT TOLERATED WELL. I&O'S COMPLETE. SON AT BEDSIDE.

## 2022-01-30 NOTE — NUR
PATIENT BECOMING AGGITATED AGAIN AFTER NURSING CHANGED HIS GOWN, REPOSITIONED
HIM & DID I&O'S. GAVE PRN ATIVAN IV.

## 2022-01-30 NOTE — NUR
PATIENT INCREASINGLY CONGESTED AND TRYING TO COUGH UP THICK ORAL SECRETIONS
BUT IS UNABLE TO GET MUCH OF IT UP. NOTED "RATTLE" WITH BREATHING. CALLED
HOSPITALIST, SEE NEW ORDERS.

## 2022-01-30 NOTE — NUR
1045-REPORT CALLED TO EDYTA RITTER. ALL QUESTIONS ANSWERED.
1055- PT TRANSFERED TO SURGICAL BED AND TAKEN TO ROOM 326, SON DORIE AWARE.
EDYTA RITTER MET BEDSIDE.

## 2022-01-30 NOTE — NUR
PT SUCTIONED AS MOST OF COARSENESS IN CHEST SEEMS TO BE HIGHER THAN THE LUNGS.
SMALL AMOUNT OF THICK MUCOUS/SPUTUM SUCTIONED.  PT REPOSITIONED APPROX. Q2
HRS.

## 2022-01-30 NOTE — NUR
PT ABLE TO REST FOR 1-2 HOUR PERIODS THROUGHOUT THE NIGHT. WHEN AWAKE, PT
RESTLESS/AGITATED, ATTEMPTING TO GET OUT OF BED, AND PULLING AT
DRESSINGS/UROSTOMY.  MITS PLACED FOR PROTECTION OF INCISION/DRAIN/UROSTOMY DUE
TO INABILITY TO FOLLOW COMMANDS.  . THIS RN DID HAVE TO REPLACE UROSTOMY AND
POUCH OVER BRAD DRAIN X3 THIS SHIFT RELATED TO LEAKING AND ATTEMPT TO KEEP
INCISION AS CLEAN AS POSSIBLE.  .  UNABLE TO GET A GOOD SEAL OVER BRAD
DRAIN, FREQUENTLY LEAKING. UNABLE TO GET ACCURATE OUTPUT FROM DRAIN RELATED TO
THE LEAKING.  DUE TO ALMOST CONSTANT MOISTURE, INCISION LEFT OPEN TO AIR.
CLEASED WITH NS AND GAUZE WITH OSTOMY DRESSING CHANGES. PT CONFUSED, DOES NOT
ANSWER ANY QUESTIONS APPROPRIATELY.  WHEN RESTING, HR 90'S. WHILE AWAKE AND
AGITATED, 'S. PT DOES SHOW S/S OF PAIN WITH DRESSING CHANGES. PRN
DILAUDID GIVEN FOR PAIN AS DOCUMENTED IN MAR.  PRN ATIVAN GIVEN FOR
RESTLESSNESS/AGITATION AS DOCUMENTED IN MAR. PT REFUSED TO SWALLOW PILLS THIS
SHIFT. R EJ REMOVED AS DRESSING CONTINUOUSLY COMING OFF AFTER BEING CHANGED
AND REINFORCED. PT CURRENTLY SLEEPING. CALL LIGHT WITHIN REACH, BED ALARM ON.

## 2022-01-31 VITALS — TEMPERATURE: 97.5 F | DIASTOLIC BLOOD PRESSURE: 70 MMHG | SYSTOLIC BLOOD PRESSURE: 131 MMHG | HEART RATE: 106 BPM

## 2022-01-31 VITALS — HEART RATE: 94 BPM | DIASTOLIC BLOOD PRESSURE: 58 MMHG | TEMPERATURE: 98.2 F | SYSTOLIC BLOOD PRESSURE: 116 MMHG

## 2022-01-31 VITALS — SYSTOLIC BLOOD PRESSURE: 141 MMHG | HEART RATE: 113 BPM | TEMPERATURE: 98.2 F | DIASTOLIC BLOOD PRESSURE: 67 MMHG

## 2022-01-31 VITALS — DIASTOLIC BLOOD PRESSURE: 71 MMHG | TEMPERATURE: 98.6 F | HEART RATE: 106 BPM | SYSTOLIC BLOOD PRESSURE: 136 MMHG

## 2022-01-31 VITALS — SYSTOLIC BLOOD PRESSURE: 130 MMHG | HEART RATE: 118 BPM | DIASTOLIC BLOOD PRESSURE: 73 MMHG | TEMPERATURE: 99 F

## 2022-01-31 VITALS — TEMPERATURE: 99.1 F | SYSTOLIC BLOOD PRESSURE: 147 MMHG | DIASTOLIC BLOOD PRESSURE: 75 MMHG | HEART RATE: 113 BPM

## 2022-01-31 LAB
ANION GAP SERPL CALC-SCNC: 5 MMOL/L (ref 7–16)
BASOPHILS # BLD: 0 K/MM3 (ref 0–0.2)
BASOPHILS NFR BLD AUTO: 0.2 % (ref 0–2)
BUN SERPL-MCNC: 7 MG/DL (ref 8–26)
CALCIUM SERPL-MCNC: 8.7 MG/DL (ref 8.4–10.2)
CHLORIDE SERPL-SCNC: 107 MMOL/L (ref 98–107)
CO2 SERPL-SCNC: 25 MMOL/L (ref 23–31)
CREAT SERPL-SCNC: 1.03 MG/DL (ref 0.72–1.25)
EOSINOPHIL # BLD: 0.1 K/MM3 (ref 0–0.7)
EOSINOPHIL NFR BLD: 2.1 % (ref 0–4)
ERYTHROCYTE [DISTWIDTH] IN BLOOD BY AUTOMATED COUNT: 15.9 % (ref 11.5–14.5)
GLUCOSE SERPL-MCNC: 128 MG/DL (ref 70–99)
GRANULOCYTES # BLD AUTO: 71.5 % (ref 42.2–75.2)
HCT VFR BLD AUTO: 26.2 % (ref 42–52)
HGB BLD-MCNC: 8.7 G/DL (ref 13.5–18)
LYMPHOCYTES # BLD: 0.7 K/MM3 (ref 1.2–3.4)
LYMPHOCYTES NFR BLD: 11.6 % (ref 20–51)
MAGNESIUM SERPL-MCNC: 1.7 MG/DL (ref 1.6–2.6)
MCH RBC QN AUTO: 30 PG (ref 27–31)
MCHC RBC AUTO-ENTMCNC: 33 G/DL (ref 33–37)
MCV RBC AUTO: 90 FL (ref 80–100)
MONOCYTES # BLD: 0.8 K/MM3 (ref 0.1–0.6)
MONOCYTES NFR BLD AUTO: 13.7 % (ref 1.7–9.3)
NEUTROPHILS # BLD: 4.1 K/MM3 (ref 1.4–6.5)
PHOSPHATE SERPL-MCNC: 1.9 MG/DL (ref 2.3–4.7)
PLATELET # BLD AUTO: 87 K/MM3 (ref 130–400)
PMV BLD AUTO: 11.5 FL (ref 7.4–10.4)
POTASSIUM SERPL-SCNC: 3.5 MMOL/L (ref 3.5–4.5)
RBC # BLD AUTO: 2.92 M/MM3 (ref 4.2–5.6)
SODIUM SERPL-SCNC: 137 MMOL/L (ref 136–145)

## 2022-01-31 NOTE — NUR
Patient resting in bed. He was 3 assist this am to get cleaned up &
repositioned. Noted to have leaking from midline incision. Staples intact,
gauze replaced. New ostomy appliace around shandra drain. New ostomy appliace
applied to illeoconduit. cut to fit. abdomen is bruised. Upon changing
appliance, odor noted from stoma, shandra drain, & midline. His abdomen is
soft, not distended.

## 2022-01-31 NOTE — NUR
rounded. Discussed cares.  aware of odor, he
believes it is due to concentrated urine. Stents removed from Stoma. Stoma
remains pink. Adequate urine output with, urine yellow with sediment
present. Sade drain to DD. Narcotics have been avoided today to see
if patient becomes more aware & alert. Patient has been a little more awake &
more vocal. His son has been at bedside today. Mitts off when son at bedside.
Patient was given sips of water & oral cares provided, mouth has been very
dry. Ivf continues to RUE. Midline with staples gauze intact. Will monitor,
high fall risk followed.

## 2022-01-31 NOTE — NUR
called, he states he will be in to see patient soon. I will review
my concerns with  when he rounds

## 2022-01-31 NOTE — NUR
Patient continues to be very restless-climbing over arm rails-trying to bite
mitts and staff. Very anxious stating he is going home. Spoke with BUD Tang-hospitalist-new orders received and initiated.

## 2022-01-31 NOTE — NUR
Update called to . I did discuss with him the possibility for needing
something for DVT prophylaxis, he will speak to hospitlist in the AM.

## 2022-01-31 NOTE — NUR
rounded. We reviewed concerns. Nena Garibay per orders. We discussed
plan of care. Patient resting. Detail Level: Simple Additional Notes: Patient consent was obtained to proceed with the visit and recommended plan of care after discussion of all risks and benefits, including the risks of COVID-19 exposure. Eyes with no visual disturbances.  Ears clean and dry and no hearing difficulties. Nose with pink mucosa and no drainage.  Mouth mucous membranes moist and pink.  No tenderness or swelling to throat or neck.

## 2022-01-31 NOTE — NUR
ABD PLACE AT MIDLINE INCISION.  SOME SEROUS DRAINAGE COMING LEAKING PAST
INCISION.  DRAINAGE HAS A BIT OF A FOUL ODOR.

## 2022-01-31 NOTE — NUR
Patient continue to climb over bed rails trying to get out of bed. States he
wants to go to his truck and go home. IV site to right upper arm noted to be
infiltrated. Restarted in right hand-22g-x2 attempts. Ativan given for
agitation. Will monitor.

## 2022-02-01 VITALS — HEART RATE: 103 BPM | TEMPERATURE: 98.6 F | DIASTOLIC BLOOD PRESSURE: 72 MMHG | SYSTOLIC BLOOD PRESSURE: 120 MMHG

## 2022-02-01 VITALS — SYSTOLIC BLOOD PRESSURE: 128 MMHG | DIASTOLIC BLOOD PRESSURE: 61 MMHG | TEMPERATURE: 98.6 F | HEART RATE: 110 BPM

## 2022-02-01 VITALS — DIASTOLIC BLOOD PRESSURE: 74 MMHG | SYSTOLIC BLOOD PRESSURE: 115 MMHG | TEMPERATURE: 100 F | HEART RATE: 99 BPM

## 2022-02-01 VITALS — HEART RATE: 101 BPM | TEMPERATURE: 99.8 F | DIASTOLIC BLOOD PRESSURE: 57 MMHG | SYSTOLIC BLOOD PRESSURE: 113 MMHG

## 2022-02-01 VITALS — DIASTOLIC BLOOD PRESSURE: 79 MMHG | SYSTOLIC BLOOD PRESSURE: 152 MMHG | HEART RATE: 111 BPM | TEMPERATURE: 99.1 F

## 2022-02-01 VITALS — HEART RATE: 110 BPM | TEMPERATURE: 97.8 F | SYSTOLIC BLOOD PRESSURE: 148 MMHG | DIASTOLIC BLOOD PRESSURE: 68 MMHG

## 2022-02-01 LAB
ANION GAP SERPL CALC-SCNC: 7 MMOL/L (ref 7–16)
BASOPHILS # BLD: 0 K/MM3 (ref 0–0.2)
BASOPHILS NFR BLD AUTO: 0.3 % (ref 0–2)
BUN SERPL-MCNC: 11 MG/DL (ref 8–26)
CALCIUM SERPL-MCNC: 8.9 MG/DL (ref 8.4–10.2)
CHLORIDE SERPL-SCNC: 107 MMOL/L (ref 98–107)
CO2 SERPL-SCNC: 22 MMOL/L (ref 23–31)
CREAT SERPL-SCNC: 1.22 MG/DL (ref 0.72–1.25)
EOSINOPHIL # BLD: 0.2 K/MM3 (ref 0–0.7)
EOSINOPHIL NFR BLD: 1.8 % (ref 0–4)
ERYTHROCYTE [DISTWIDTH] IN BLOOD BY AUTOMATED COUNT: 15.8 % (ref 11.5–14.5)
GLUCOSE SERPL-MCNC: 121 MG/DL (ref 70–99)
GRANULOCYTES # BLD AUTO: 76.1 % (ref 42.2–75.2)
HCT VFR BLD AUTO: 29.2 % (ref 42–52)
HGB BLD-MCNC: 9.7 G/DL (ref 13.5–18)
LYMPHOCYTES # BLD: 0.7 K/MM3 (ref 1.2–3.4)
LYMPHOCYTES NFR BLD: 7.8 % (ref 20–51)
MAGNESIUM SERPL-MCNC: 1.7 MG/DL (ref 1.6–2.6)
MCH RBC QN AUTO: 30 PG (ref 27–31)
MCHC RBC AUTO-ENTMCNC: 33 G/DL (ref 33–37)
MCV RBC AUTO: 89 FL (ref 80–100)
MONOCYTES # BLD: 1.2 K/MM3 (ref 0.1–0.6)
MONOCYTES NFR BLD AUTO: 13 % (ref 1.7–9.3)
NEUTROPHILS # BLD: 6.7 K/MM3 (ref 1.4–6.5)
PH UR STRIP.AUTO: 7 [PH] (ref 5–8)
PLATELET # BLD AUTO: 110 K/MM3 (ref 130–400)
PMV BLD AUTO: 11.5 FL (ref 7.4–10.4)
POTASSIUM SERPL-SCNC: 3.7 MMOL/L (ref 3.5–4.5)
RBC # BLD AUTO: 3.27 M/MM3 (ref 4.2–5.6)
RBC # UR STRIP.AUTO: (no result) /UL
RBC # UR: >50 /HPF (ref 0–2)
SODIUM SERPL-SCNC: 136 MMOL/L (ref 136–145)
SP GR UR STRIP.AUTO: 1.01 (ref 1–1.03)
UA DIPSTICK PNL UR STRIP.AUTO: (no result)
URINE LEUKOCYTE ESTERASE: (no result)
URN COLLECT METHOD CLASS: (no result)
UROBILINOGEN UR STRIP.AUTO-MCNC: >=4 MG/DL

## 2022-02-01 NOTE — NUR
Report received from RIYA Galan. Pt in bed resting, turned to Left side for
comfort, per night shift pt has been awake most of night, will continue to
monitor.

## 2022-02-01 NOTE — NUR
Patient continues to set off bed alarm and is very restless. Will not leave
CPAP on. States he is getting out of here. Repositioned in bed with pillow
support. Will continue to monitor.

## 2022-02-01 NOTE — NUR
Pth as done well over shift, sleeping often during daytime despite attemtps to
keep him awake, his son has been at bedside all day. Pt did request sprite
this afternoon and took a coupld of sips well. PAC accessed to [facility] and good
blood return, utilizing for infusions of electrolytme meds. Changed ABD to
midline dressing 3 times over shift and only changed ostomy appliance to L
shandra drains once over shift. R urostomy draining yellow clear urine.
Turning q2 hours for comfort. Denies needs, will give bedside shift report to
nightshift nurse who will resume care.

## 2022-02-01 NOTE — NUR
Bedside shift report received, assumed care for night shift. Assessment
complete. More alert this shift but still confused. Tolerated HS medications
crushed in pudding. Port to right chest flushes without difficulty-good blood
return. Denies pain/nausea/shortness of breath. VS remain stable. Sade
drain with reddish output. Ileal conduit draining yellow cloudy/sediment
urine. Midline incision-staples-edges well approximated. No drainage noted at
this time. Dressing CDI. SCDs bilat. Denies current needs. Call light in
reach. Will monitor.

## 2022-02-01 NOTE — NUR
Pt sleepy but arouses when changing wafer and pouch to shandra drain site.
Drainage from this wafer noted and changed but ileal conduit site is still
intact so not changing at this time. ABD midline dressing  changed twice
already this shift due to distal drainage that is dark brown/red. Will
continue to monitor.

## 2022-02-01 NOTE — NUR
Pt continues to be awake in room trying ot climb out of bed to go home. Mitts
are on due to pulling out IV and trying to remove ostomy bags. Has received
several dose of ativan with no results. Denies having pain. Unable to take PO
pills due to NPO status and worry of aspirating. ST consult not completed yet.
CNA is at bedside sitting with patient.

## 2022-02-01 NOTE — NUR
Assessment charted. Pt in bed resting, awakens but unable to understand
verbalization. Turning q2hr to offset sacrem. Ileal Conduit draining davion
sediment filled to bag via dependent drainage at side of bed. Midline staples
are well approximated and draining a dark serosanguinous drainage. Sade
drain is draning serosanguinous drainage into wee bag with stents present. Pt
is coughing often in room and found to have phlegm in back of throat that was
suctioned with yanker, oral care provided. Bed alarm on and mitts to protect
patient, will continue to monitor.

## 2022-02-01 NOTE — NUR
Has been awake entire shift trying to climb out of bed and go home. Sade
drain with minimal output. Adequate ouput to orostomy. Dressing to midline
incision changed x3 due to drainage. No s/s of distress n oted. WIll monitor.

## 2022-02-01 NOTE — NUR
The patient has had altered mental status and agitation after surgery. PT is
recommending possible SNF. EDUARDO attempted to contact the patient's wife, Scarlet,
to address the above and review discharge plan. EDUARDO left her a voicemail. The
patient's son, German, then arrived to the hospital. EDUARDO met with German and
updated him on the above. German reports that they are hopeful that the patient
can still return home with Scarlet and resume home health. German reports that he
needs to talk to the patient's wife and his brother some more about the
options and how his father is doing, before making any final decisions. EDUARDO
provided him with this SW's phone number. At this time, the patient's family
plans on him returning home with the wife and home health. SW to continue to
follow.

## 2022-02-02 VITALS — SYSTOLIC BLOOD PRESSURE: 113 MMHG | HEART RATE: 104 BPM | DIASTOLIC BLOOD PRESSURE: 77 MMHG | TEMPERATURE: 98.3 F

## 2022-02-02 VITALS — HEART RATE: 85 BPM | TEMPERATURE: 97.6 F | DIASTOLIC BLOOD PRESSURE: 60 MMHG | SYSTOLIC BLOOD PRESSURE: 128 MMHG

## 2022-02-02 VITALS — HEART RATE: 107 BPM | SYSTOLIC BLOOD PRESSURE: 112 MMHG | DIASTOLIC BLOOD PRESSURE: 64 MMHG | TEMPERATURE: 98.7 F

## 2022-02-02 VITALS — DIASTOLIC BLOOD PRESSURE: 61 MMHG | HEART RATE: 94 BPM | SYSTOLIC BLOOD PRESSURE: 105 MMHG | TEMPERATURE: 99.4 F

## 2022-02-02 VITALS — TEMPERATURE: 97.6 F | HEART RATE: 94 BPM | DIASTOLIC BLOOD PRESSURE: 66 MMHG | SYSTOLIC BLOOD PRESSURE: 121 MMHG

## 2022-02-02 VITALS — DIASTOLIC BLOOD PRESSURE: 57 MMHG | TEMPERATURE: 98.6 F | HEART RATE: 84 BPM | SYSTOLIC BLOOD PRESSURE: 103 MMHG

## 2022-02-02 LAB
ANION GAP SERPL CALC-SCNC: 4 MMOL/L (ref 7–16)
BASOPHILS # BLD: 0 K/MM3 (ref 0–0.2)
BASOPHILS NFR BLD AUTO: 0.3 % (ref 0–2)
BUN SERPL-MCNC: 14 MG/DL (ref 8–26)
CALCIUM SERPL-MCNC: 8.9 MG/DL (ref 8.4–10.2)
CHLORIDE SERPL-SCNC: 109 MMOL/L (ref 98–107)
CO2 SERPL-SCNC: 24 MMOL/L (ref 23–31)
CREAT SERPL-SCNC: 1.26 MG/DL (ref 0.72–1.25)
EOSINOPHIL # BLD: 0.1 K/MM3 (ref 0–0.7)
EOSINOPHIL NFR BLD: 2.3 % (ref 0–4)
ERYTHROCYTE [DISTWIDTH] IN BLOOD BY AUTOMATED COUNT: 16 % (ref 11.5–14.5)
GLUCOSE SERPL-MCNC: 114 MG/DL (ref 70–99)
GRANULOCYTES # BLD AUTO: 71.4 % (ref 42.2–75.2)
HCT VFR BLD AUTO: 26.1 % (ref 42–52)
HGB BLD-MCNC: 8.3 G/DL (ref 13.5–18)
LYMPHOCYTES # BLD: 0.7 K/MM3 (ref 1.2–3.4)
LYMPHOCYTES NFR BLD: 10.5 % (ref 20–51)
MCH RBC QN AUTO: 30 PG (ref 27–31)
MCHC RBC AUTO-ENTMCNC: 32 G/DL (ref 33–37)
MCV RBC AUTO: 93 FL (ref 80–100)
MONOCYTES # BLD: 0.9 K/MM3 (ref 0.1–0.6)
MONOCYTES NFR BLD AUTO: 14.2 % (ref 1.7–9.3)
NEUTROPHILS # BLD: 4.4 K/MM3 (ref 1.4–6.5)
PHOSPHATE SERPL-MCNC: 2.4 MG/DL (ref 2.3–4.7)
PLATELET # BLD AUTO: 117 K/MM3 (ref 130–400)
PMV BLD AUTO: 10.7 FL (ref 7.4–10.4)
POTASSIUM SERPL-SCNC: 3.9 MMOL/L (ref 3.5–4.5)
RBC # BLD AUTO: 2.81 M/MM3 (ref 4.2–5.6)
SODIUM SERPL-SCNC: 137 MMOL/L (ref 136–145)

## 2022-02-02 NOTE — NUR
Dressing to midline incision with notable drainage, dressing changed, 4X4's
and hypafix tape used.  ostomy to ileal conduit intact and not replaced at
this time.  Staples intact, no reddness, warmth, or edema noted to incision.
Pt. continues to be confused.

## 2022-02-02 NOTE — NUR
Patient finally fell asleep at approx 0400. Adequate output to ileal conduit.
Labs drawn from right chest port without difficulty. Dressing to midline
changed x2 this shift. Sade drain with 25mls reddish fluid. Took PO meds
crushed in pudding well. Received one dose of ativan for agitation in the
night. No s/s of distress at this time. Call light in reach. Will monitor.

## 2022-02-02 NOTE — NUR
Patient has not slept at all this shift despite receiving PO meds. Trying to
climb over arm rails to go home. Has pulled owen bag in bed and unclamped it.
Will not wear CPAP. Ativan given per dr juan.

## 2022-02-02 NOTE — NUR
Patient more awake this am. Talking in room, hard to understand at times. Not
fully oriented.  Patient found to be incontinent of stool. Patient provided
with full bed bath, fresh linens. Patient was 3 assist to bedside commode. He
had more stool.  Pericares given with brief on and barrier cream. Patient 3
assist to sit up in chair. Chair alarm high, high fall risk followed. Patient
worn out after activity & not sleeping in chair. Port with Ivf per orders.
Int to Right wrist. Sade drain to DD, ostomy appliance intact.  Midline
dressing intact. Illeoconduit ostomy appliance intact, draining adequate
output to owen bag. Patient abdomen soft. When patient wakes breakfast will
be offered. Will closely monitor.

## 2022-02-02 NOTE — NUR
PATIENT UP TO COMMODE X3 WITH LOOSE STOOL. PATIENT IS ABLE TO COMMUNICATE WHEN
HE NEEDS TO USE THE COMMODE. PATIENT NEEDS ASSSITANCE OF 2 AND IS UNSTEADY ON
HIS FEET. PATIENT WAS ABLE TO FEED HIMSELF HALF OF HIS BREAKFAST WITH SOME
ASSISTANCE. PATIENT WAS ORIENTED TO TIME AND PLACE AND CARRIED A CONVERSATION
THROUGHOUT BREAKFAST. PATIENT'S CB DRAIN WAS DC'D. PATIENT TOLERATED
WELL. OSTOMY APPLIANCES CHANGED X2 THROUGHOUT SHIFT DUE TO DRAINAGE. PATIENT'S
SKIN IS APPEARING IRRITATED WITH FREQUENT DRESSING CHANGES. PATIENT IS RESTING
IN BED WITH CALL LIGHT NEAR AND SON AT BEDSIDE.

## 2022-02-02 NOTE — NUR
Pt. laying in bed. Pt. is A&OX3, assessment complete. PORT to rt. chest
patent.  IV fluids infusing per orders.  Pt. assisted up to the bedside
commode with 2 assist.  Pt. had a small amount of loose stool.  Laura care
provided.  Pt. repositioned in bed for comfort. Pt. denies pain or other
needs, call light within reach.

## 2022-02-02 NOTE — NUR
The patient's RN notified EDUARDO that after the hospitalist spoke to the family
today, the patient's family is agreeable to pursue SNF. EDUARDO met with the
patient's son, Marques, and reviewed the above. Marques confirms that him and their
family are agreeable to SNF and chose 1) [facility] 2)
[facility]. EDUARDO contacted and faxed a referral to [facility]. EDUARDO attempted to contact Mitch at [facility]. EDUARDO left her a
voicemail and faxed over the referral. Awaiting screens.
 
*Discharge plan: SNF or SB*

## 2022-02-03 VITALS — SYSTOLIC BLOOD PRESSURE: 109 MMHG | DIASTOLIC BLOOD PRESSURE: 65 MMHG | TEMPERATURE: 97.6 F | HEART RATE: 95 BPM

## 2022-02-03 VITALS — HEART RATE: 85 BPM | DIASTOLIC BLOOD PRESSURE: 60 MMHG | TEMPERATURE: 98.5 F | SYSTOLIC BLOOD PRESSURE: 122 MMHG

## 2022-02-03 VITALS — SYSTOLIC BLOOD PRESSURE: 124 MMHG | DIASTOLIC BLOOD PRESSURE: 64 MMHG | HEART RATE: 86 BPM | TEMPERATURE: 97.4 F

## 2022-02-03 VITALS — TEMPERATURE: 98.4 F | DIASTOLIC BLOOD PRESSURE: 67 MMHG | SYSTOLIC BLOOD PRESSURE: 140 MMHG | HEART RATE: 104 BPM

## 2022-02-03 VITALS — SYSTOLIC BLOOD PRESSURE: 118 MMHG | TEMPERATURE: 97.9 F | HEART RATE: 85 BPM | DIASTOLIC BLOOD PRESSURE: 56 MMHG

## 2022-02-03 VITALS — DIASTOLIC BLOOD PRESSURE: 74 MMHG | TEMPERATURE: 97.6 F | SYSTOLIC BLOOD PRESSURE: 119 MMHG | HEART RATE: 93 BPM

## 2022-02-03 VITALS — SYSTOLIC BLOOD PRESSURE: 129 MMHG | DIASTOLIC BLOOD PRESSURE: 75 MMHG | TEMPERATURE: 97.9 F | HEART RATE: 102 BPM

## 2022-02-03 LAB
ANION GAP SERPL CALC-SCNC: 8 MMOL/L (ref 7–16)
BASOPHILS # BLD: 0 K/MM3 (ref 0–0.2)
BASOPHILS NFR BLD AUTO: 0.4 % (ref 0–2)
BUN SERPL-MCNC: 16 MG/DL (ref 8–26)
CALCIUM SERPL-MCNC: 9.3 MG/DL (ref 8.4–10.2)
CHLORIDE SERPL-SCNC: 110 MMOL/L (ref 98–107)
CO2 SERPL-SCNC: 21 MMOL/L (ref 23–31)
CREAT SERPL-SCNC: 1.23 MG/DL (ref 0.72–1.25)
EOSINOPHIL # BLD: 0.2 K/MM3 (ref 0–0.7)
EOSINOPHIL NFR BLD: 5 % (ref 0–4)
ERYTHROCYTE [DISTWIDTH] IN BLOOD BY AUTOMATED COUNT: 16.3 % (ref 11.5–14.5)
GLUCOSE SERPL-MCNC: 104 MG/DL (ref 70–99)
GRANULOCYTES # BLD AUTO: 67.8 % (ref 42.2–75.2)
HCT VFR BLD AUTO: 25.3 % (ref 42–52)
HGB BLD-MCNC: 8.2 G/DL (ref 13.5–18)
LYMPHOCYTES # BLD: 0.6 K/MM3 (ref 1.2–3.4)
LYMPHOCYTES NFR BLD: 12.8 % (ref 20–51)
MCH RBC QN AUTO: 30 PG (ref 27–31)
MCHC RBC AUTO-ENTMCNC: 32 G/DL (ref 33–37)
MCV RBC AUTO: 92 FL (ref 80–100)
MONOCYTES # BLD: 0.6 K/MM3 (ref 0.1–0.6)
MONOCYTES NFR BLD AUTO: 12.6 % (ref 1.7–9.3)
NEUTROPHILS # BLD: 3.3 K/MM3 (ref 1.4–6.5)
PLATELET # BLD AUTO: 136 K/MM3 (ref 130–400)
PMV BLD AUTO: 10.9 FL (ref 7.4–10.4)
POTASSIUM SERPL-SCNC: 3.4 MMOL/L (ref 3.5–4.5)
RBC # BLD AUTO: 2.76 M/MM3 (ref 4.2–5.6)
SODIUM SERPL-SCNC: 139 MMOL/L (ref 136–145)

## 2022-02-03 NOTE — NUR
PATIENT RESTING IN CHAIR UPON ARRIVAL. PATIENT VSS. MIDLINE DRESSING CHANGED.
PATIENT'S COGNITION HAS IMPROVED FROM YESTERDAY. PATIENT WAS ABLE TO EXPLAIN
THE YEAR, MONTH, AND LOCATION. PATIENT FED HIMSELF BREAKFAST. CLEANED AND
APPLIED NEW OSTOMY APPLIANCE. PT HAS BEEN IN, AMBULATED PATIENT TO RESTROOM.
PATIENT HAD LOOSE BOWEL MOVEMENT. PATIENT'S DIET WAS ADVANCED PER
SPEECH. LOOKING AT PLACEMENT IN [facility]. FAMILY IN ROOM WITH PATIENT, CALL
LIGHT WITHIN REACH.

## 2022-02-03 NOTE — NUR
PATIENT AMBULATED WITH THERAPY OUT IN THE HALLWAY AND TO THE BATHROOM
MULTIPLE TIMES TODAY. PATIENT HAS SHOWN A SIGNIFICANT IMPROVEMENT FROM
YESTERDAY. PATIENT WAS ABLE TO FEED HIMSELF HIS MEALS. PATIENT HAS
EXPRESSED BEING A LITTLE SORE ON HIS STOMACH WHEN HE COUGHS BUT DENIES
NEEDING ANYTHING TO SUPPRESS THE PAIN. PATIENT MIDLINE DRESSING CHANGED.
OSTOMY APPLIANCE CHANGED. AREA APPEARED REDDENED DUE TO FREQUENT CHANGES.
APPLIED OSTOMY POWDER AND PASTE TO KEEP APPLIANCE FROM LEAKING.  PATIENT'S
FAMILY ADDRESSED CONCERNS REGARDING PLACEMENT DUE TO MORNING FALL. SPOKE WITH
FAMILY ABOUT FORWARDING THEIR CONCERNS TO DR. FU. FAMILY WANTS TO SPEAK
DIRECTLY WITH NICKIE REGARDING HIS DISCHARGE. FAMILY WANTS TO BE NOTIFIED WHEN
NICKIE ROUNDS IN THE AM ON FRIDAY MORNING. PATIENT'S SON, CHON, IS THE
MEDICAL POA AND WOULD LIKE TO BE TELEPHONED UPON NICKIE'S ARRIVAL.

## 2022-02-03 NOTE — NUR
The PA notified EDUARDO that the patient is doing a lot better and may be ready to
discharge tomorrow, if he continues to do well.
 
EDUARDO notified Harriet at [facility]. Harriet reports that they are able to
accept the patient, but that they would not be able to take the patient until
next Tuesday, 2/8. She reports that they do not have any admitting staff
members until Tuesday. She states that they would also require a PCR COVID
test.
 
EDUARDO followed up with Alpa at [facility]. Alpa reports she needs to
send the patient's info to their doctor and have him review it. She states
she will get back to this .
 
*Discharge plan: [facility] has accepted, but cannot take until
Tuesday*

## 2022-02-03 NOTE — NUR
PT IN BED, WATCHING TV. ATE MOST OF DINNER TRAY PROVIDED. IS ALERT AND
ORIENTED X2. 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
MIDLINE INCISION WITH STAPLES INTACT. LOWER ABD BRUISING NOTED. HAS RFA INT
AND RT PORT ACCESSED, IVF PER PORT.

## 2022-02-03 NOTE — NUR
Alpa, at [facility], reports that they are able to accept the patient
tomorrow. She states that she will touch base with  in the
morning for the doc-to-doc and RN report phone numbers.
 
EDUARDO met with the patient and his daughter-in-law, Lauren, to update on the
facilities and how the patient may be ready to discharge by tomorrow. The
patient's son, German, was present for the conversation by [facility]. Lauren
and German report that their preference is for the patient to go to [place].
They really do not want the patient to have to go to [facility], due to it being
further away for the patient's wife and other son, Marques. They inquired that if
the patient is to discharge tomorrow, if they can just take the patient home
with home health and increased assistance from the family and then look at him
transitioning to [facility] on Tuesday from the home. They would like
to hear from Dr. Lyons today and have some time to talk about the options
amongst themselves. They report that the patient is finally coming to and less
confused today and are hopeful the patient would have some more time in the
hospital before discharging.
 
EDUARDO attempted to contact Harriet at [facility] to ask about the patient
coming to their facility from the home on Tuesday, if he does discharge
tomorrow. EDUARDO left her a voicemail. EDUARDO updated the patient's PA.

## 2022-02-03 NOTE — NUR
Pts daughter in law brought to my attention that the pt was delivered someone
else's tray.  Pt was okay with the food that was ordered although the meat was
the wrong consistency.  Daughter in law, Lauren, was adament that they tray
was okay and I did not need to order another one.  The meat was the only thing
that was wrong consistency.  Meat was cut/shredded up in to small pieces.
Lauren stated that she would stay with him while he ate to make sure he did
okay.  Still ordered another tray of the correct consistency just incase and
let her know.  Notified the kitchen so that the other pt would get a tray
brought up.

## 2022-02-03 NOTE — NUR
PT WANTING TO GO TO HIS BED, ORIENTED TO PLACE AND TIME. MEDICATED WITH
SEROQUEL 25MG PO NOW. DENIES PAIN.

## 2022-02-04 VITALS — DIASTOLIC BLOOD PRESSURE: 68 MMHG | SYSTOLIC BLOOD PRESSURE: 117 MMHG | HEART RATE: 92 BPM | TEMPERATURE: 97.4 F

## 2022-02-04 VITALS — TEMPERATURE: 97.9 F | SYSTOLIC BLOOD PRESSURE: 132 MMHG | HEART RATE: 78 BPM | DIASTOLIC BLOOD PRESSURE: 66 MMHG

## 2022-02-04 VITALS — TEMPERATURE: 97.4 F | SYSTOLIC BLOOD PRESSURE: 117 MMHG | DIASTOLIC BLOOD PRESSURE: 68 MMHG | HEART RATE: 92 BPM

## 2022-02-04 VITALS — HEART RATE: 82 BPM | TEMPERATURE: 97.9 F | SYSTOLIC BLOOD PRESSURE: 124 MMHG | DIASTOLIC BLOOD PRESSURE: 83 MMHG

## 2022-02-04 VITALS — TEMPERATURE: 97.6 F | HEART RATE: 85 BPM | DIASTOLIC BLOOD PRESSURE: 82 MMHG | SYSTOLIC BLOOD PRESSURE: 113 MMHG

## 2022-02-04 LAB
ANION GAP SERPL CALC-SCNC: 4 MMOL/L (ref 7–16)
ANISOCYTOSIS BLD QL: (no result)
BASOPHILS NFR BLD MANUAL: 1 % (ref 0–2)
BUN SERPL-MCNC: 12 MG/DL (ref 8–26)
CALCIUM SERPL-MCNC: 8.9 MG/DL (ref 8.4–10.2)
CHLORIDE SERPL-SCNC: 112 MMOL/L (ref 98–107)
CO2 SERPL-SCNC: 22 MMOL/L (ref 23–31)
CREAT SERPL-SCNC: 1.16 MG/DL (ref 0.72–1.25)
EOSINOPHIL NFR BLD: 2 % (ref 0–4)
ERYTHROCYTE [DISTWIDTH] IN BLOOD BY AUTOMATED COUNT: 16.1 % (ref 11.5–14.5)
GLUCOSE SERPL-MCNC: 100 MG/DL (ref 70–99)
HCT VFR BLD AUTO: 24.6 % (ref 42–52)
HGB BLD-MCNC: 7.8 G/DL (ref 13.5–18)
HYPOCHROMIA BLD QL SMEAR: (no result)
LYMPHOCYTES NFR BLD MANUAL: 19 % (ref 20–51)
MCH RBC QN AUTO: 30 PG (ref 27–31)
MCHC RBC AUTO-ENTMCNC: 32 G/DL (ref 33–37)
MCV RBC AUTO: 93 FL (ref 80–100)
MONOCYTES NFR BLD: 9 % (ref 1.7–9.3)
NEUTS BAND NFR BLD: 1 % (ref 0–10)
NEUTS SEG NFR BLD MANUAL: 68 % (ref 42–75.2)
PLATELET # BLD AUTO: 140 K/MM3 (ref 130–400)
PLATELET BLD QL SMEAR: NORMAL
PMV BLD AUTO: 11.1 FL (ref 7.4–10.4)
POTASSIUM SERPL-SCNC: 3.8 MMOL/L (ref 3.5–4.5)
RBC # BLD AUTO: 2.64 M/MM3 (ref 4.2–5.6)
SODIUM SERPL-SCNC: 138 MMOL/L (ref 136–145)

## 2022-02-04 NOTE — NUR
Dr Lyons has been in to see patient and discussed pt and discharge to
[place] with pt as well as son Marques.  Dr Lyons also called pts son German
regarding the discharge as well.  Pt will stay to eat lunch, receive
antibiotic and then Marques is agreeable to take pt to [place] upon discharge

## 2022-02-04 NOTE — NUR
Have been on the phone with wife multiple times today.  Pts son, Marques, is in
room with pt.   has been in to see pt and pt and Marques are on board for pt to
be going to [place].  Pts wife reported to Emy and called me with
concerns about him leaving because he had only been out of bed once.  I
informed her and Emy that the pt has been up multiple times and has
actually walked in the halls with walker and standby assist.  Pts wife was
then concerned with his abd "leaking".  I informed her that the urostomy is
producing urine which is what it is supposed to do.  Pts incision is well
approximated with staples intact and no drainage noted.  Urostomy bag in place
and draining appropriately.  Dr Hawkins has been in and talked with pt and pts
son Marques and discussed pt transferring to [place] which they both agreed they
were ready for.  Marques is willing to transfer pt to [place] when he is ready
for discharge.  Awaiting Dr Lyons at this time

## 2022-02-04 NOTE — NUR
Harriet, at [facility], contacted EDUARDO this morning and reports
that they are able to accept the patient today. EDUARDO notified the clinical team.
Prior to rounding on the patient, the patient's son (Marques) notified staff and
this SW that [facility] contacted them and they cannot take today. EDUARDO
then followed up with Harriet at the facility. Harriet reports that the patient's
family reached out to them and informed them that they are not ready for the
patient to discharge from the hospital yet. EDUARDO informed Harriet how the clinical
team is ready to discharge him today. Harriet states that she would need to
speak to the family again. EDUARDO updated the clinical team and the patient's RN.
 
EDUARDO then attended clinical rounds. The patient's son, Marques, at bedside. The
hospitalist informed the patient and his son how the patient is ready to
discharge. The patient's mental status is much approved and he is alert and
oriented. The hospitalist addressed going to [place] for rehab and the
patient is agreeable to this. Marques is also agreeable to the plan. EDUARDO then
followed up with Marques. Marques states that he needs to update his brother, German,
and the patient's wife. Marques contacted German with EDUARDO in the room. Marques informed
EDUARDO that German is upset that the patient is discharging today, but he is in
agreement with the clinical team. EDUARDO presented and read the IM form outloud to
the patient and Marques. Marques verbalized understanding and signed the form. SW
provided him with a copy.
 
The patient's RN contacted [facility] and updated their RN and staff
on the patient's condition and urostomy. [facility] is all good to
take the patient today and asked if family could transport the patient. The
patient's RN and SW presented this option to Marques. Marques is agreeable with
transporting the patient today. [facility] is asking that we provide
them with enough ostomy supplies to get them through the weekend. EDUARDO updated
the patient's RN. The PA also confirmed with the urologist, Dr. Lyons, that
he is good with the patient discharging today. Dr. Lyons plans to come and
meet with the patient and his son, before discharge today. SW updated the
patient and Marques on this.
 
Dr. Lyons then arrived to the hospital. He met with the patient and his son,
Marques. He then contacted the patient's other son, German's, and addressed German's
concerns. Dr. Lyons is also ready to discharge the patient today and informed
the patient and both sons how he will visit the patient in the nursing home.
 
The patient is to discharge today, 2/4, to [facility] for a
skilled stay. Transportation to be by private vehicle, via the patient's son.
No additional needs at this time.

## 2022-02-04 NOTE — NUR
Pt resting in bed upon entering room.  Sat pt up and turned on more lights.
Pt did talk with his wife on the phone.  She stated that she would not be
coming due to having a cold.  Gave pt a sponge bath, washed hair and assisted
him with shaving.  Breakfast has been ordered.  Pt reports that he does not
need any pain medication.  No other needs at this time, call light within
reach, will continue to monitor

## 2022-02-04 NOTE — NUR
Pt was assisted to the restroom, he did have a bowel movement.  PT was able to
wipe himself with no problems.  Pt escorted back to the recliner.  INT removed
from right hand and portacath deaccessed after flushing with heparin.
Incision remains well approximated with staples and ostomy wafer/pouch
attached with no drainage.  PRN Tylenol given for the ride per pt request. Pt
assisted with getting dressed

## 2022-02-09 ENCOUNTER — HOSPITAL ENCOUNTER (OUTPATIENT)
Dept: HOSPITAL 19 - COL.ER | Age: 87
Setting detail: OBSERVATION
LOS: 2 days | Discharge: NURSING, REHAB OR LONG-TERM CARE | End: 2022-02-11
Attending: UROLOGY | Admitting: UROLOGY
Payer: MEDICARE

## 2022-02-09 VITALS — HEART RATE: 73 BPM | DIASTOLIC BLOOD PRESSURE: 64 MMHG | SYSTOLIC BLOOD PRESSURE: 128 MMHG

## 2022-02-09 VITALS — SYSTOLIC BLOOD PRESSURE: 144 MMHG | TEMPERATURE: 97.9 F | DIASTOLIC BLOOD PRESSURE: 71 MMHG | HEART RATE: 70 BPM

## 2022-02-09 VITALS — TEMPERATURE: 98.3 F | SYSTOLIC BLOOD PRESSURE: 110 MMHG | HEART RATE: 72 BPM | DIASTOLIC BLOOD PRESSURE: 55 MMHG

## 2022-02-09 VITALS — SYSTOLIC BLOOD PRESSURE: 132 MMHG | DIASTOLIC BLOOD PRESSURE: 63 MMHG | HEART RATE: 78 BPM

## 2022-02-09 VITALS — DIASTOLIC BLOOD PRESSURE: 61 MMHG | HEART RATE: 71 BPM | SYSTOLIC BLOOD PRESSURE: 138 MMHG

## 2022-02-09 VITALS — HEART RATE: 76 BPM | DIASTOLIC BLOOD PRESSURE: 76 MMHG | SYSTOLIC BLOOD PRESSURE: 135 MMHG

## 2022-02-09 VITALS — SYSTOLIC BLOOD PRESSURE: 143 MMHG | HEART RATE: 73 BPM | DIASTOLIC BLOOD PRESSURE: 64 MMHG

## 2022-02-09 VITALS — HEART RATE: 85 BPM | SYSTOLIC BLOOD PRESSURE: 128 MMHG | DIASTOLIC BLOOD PRESSURE: 71 MMHG

## 2022-02-09 VITALS — HEART RATE: 85 BPM | TEMPERATURE: 97.8 F | DIASTOLIC BLOOD PRESSURE: 60 MMHG | SYSTOLIC BLOOD PRESSURE: 131 MMHG

## 2022-02-09 VITALS — WEIGHT: 202.83 LBS | HEIGHT: 60 IN

## 2022-02-09 VITALS — SYSTOLIC BLOOD PRESSURE: 138 MMHG | HEART RATE: 71 BPM | DIASTOLIC BLOOD PRESSURE: 75 MMHG

## 2022-02-09 DIAGNOSIS — Z92.3: ICD-10-CM

## 2022-02-09 DIAGNOSIS — C67.9: ICD-10-CM

## 2022-02-09 DIAGNOSIS — Z96.0: ICD-10-CM

## 2022-02-09 DIAGNOSIS — Z93.6: ICD-10-CM

## 2022-02-09 DIAGNOSIS — Z90.6: ICD-10-CM

## 2022-02-09 DIAGNOSIS — G47.33: ICD-10-CM

## 2022-02-09 DIAGNOSIS — T81.32XA: Primary | ICD-10-CM

## 2022-02-09 DIAGNOSIS — I10: ICD-10-CM

## 2022-02-09 LAB
ALBUMIN SERPL-MCNC: 2.9 GM/DL (ref 3.4–4.8)
ALP SERPL-CCNC: 80 U/L (ref 40–150)
ALT SERPL-CCNC: 21 U/L (ref 0–55)
ANION GAP SERPL CALC-SCNC: 7 MMOL/L (ref 7–16)
AST SERPL-CCNC: 22 U/L (ref 5–34)
BASOPHILS # BLD: 0.1 K/MM3 (ref 0–0.2)
BASOPHILS NFR BLD AUTO: 0.9 % (ref 0–2)
BILIRUB SERPL-MCNC: 0.4 MG/DL (ref 0.2–1.2)
BUN SERPL-MCNC: 17 MG/DL (ref 8–26)
CALCIUM SERPL-MCNC: 9.3 MG/DL (ref 8.4–10.2)
CHLORIDE SERPL-SCNC: 112 MMOL/L (ref 98–107)
CO2 SERPL-SCNC: 24 MMOL/L (ref 23–31)
CREAT SERPL-SCNC: 1.37 MG/DL (ref 0.72–1.25)
EOSINOPHIL # BLD: 0.3 K/MM3 (ref 0–0.7)
EOSINOPHIL NFR BLD: 4.6 % (ref 0–4)
ERYTHROCYTE [DISTWIDTH] IN BLOOD BY AUTOMATED COUNT: 16.6 % (ref 11.5–14.5)
GLUCOSE SERPL-MCNC: 102 MG/DL (ref 70–99)
GRANULOCYTES # BLD AUTO: 70 % (ref 42.2–75.2)
HCT VFR BLD AUTO: 28.8 % (ref 42–52)
HGB BLD-MCNC: 9.1 G/DL (ref 13.5–18)
LYMPHOCYTES # BLD: 0.9 K/MM3 (ref 1.2–3.4)
LYMPHOCYTES NFR BLD: 15.3 % (ref 20–51)
MCH RBC QN AUTO: 30 PG (ref 27–31)
MCHC RBC AUTO-ENTMCNC: 32 G/DL (ref 33–37)
MCV RBC AUTO: 95 FL (ref 80–100)
MONOCYTES # BLD: 0.5 K/MM3 (ref 0.1–0.6)
MONOCYTES NFR BLD AUTO: 8.2 % (ref 1.7–9.3)
NEUTROPHILS # BLD: 4.1 K/MM3 (ref 1.4–6.5)
PLATELET # BLD AUTO: 241 K/MM3 (ref 130–400)
PMV BLD AUTO: 10.7 FL (ref 7.4–10.4)
POTASSIUM SERPL-SCNC: 4.4 MMOL/L (ref 3.5–4.5)
PROT SERPL-MCNC: 5.9 GM/DL (ref 6.2–8.1)
RBC # BLD AUTO: 3.02 M/MM3 (ref 4.2–5.6)
SODIUM SERPL-SCNC: 143 MMOL/L (ref 136–145)

## 2022-02-09 PROCEDURE — G0378 HOSPITAL OBSERVATION PER HR: HCPCS

## 2022-02-09 NOTE — NUR
PT ARRIVED TO ROOM 328 VIA BED. VSS. DROWSY BUT WAKES WITHOUT DIFFICULTY. POST
OP FLUIDS INFUSING. ILIEOSTOMY CONNECTED TO MORENO BAG WITH CLEAR, YELLOW
OUTPUT. JUDI DRAIN PRESENT WITH NO DRAINAGE. DRESSING TO MIDLINE IS C,D,I. PT
DENIES NEEDS AT THIS TIME

## 2022-02-10 VITALS — TEMPERATURE: 98.1 F | HEART RATE: 63 BPM | DIASTOLIC BLOOD PRESSURE: 56 MMHG | SYSTOLIC BLOOD PRESSURE: 116 MMHG

## 2022-02-10 VITALS — TEMPERATURE: 97.9 F | DIASTOLIC BLOOD PRESSURE: 55 MMHG | SYSTOLIC BLOOD PRESSURE: 115 MMHG | HEART RATE: 78 BPM

## 2022-02-10 VITALS — SYSTOLIC BLOOD PRESSURE: 118 MMHG | TEMPERATURE: 97.6 F | DIASTOLIC BLOOD PRESSURE: 85 MMHG | HEART RATE: 97 BPM

## 2022-02-10 VITALS — DIASTOLIC BLOOD PRESSURE: 56 MMHG | HEART RATE: 69 BPM | SYSTOLIC BLOOD PRESSURE: 115 MMHG | TEMPERATURE: 97.6 F

## 2022-02-10 VITALS — DIASTOLIC BLOOD PRESSURE: 68 MMHG | TEMPERATURE: 98.1 F | SYSTOLIC BLOOD PRESSURE: 126 MMHG | HEART RATE: 58 BPM

## 2022-02-10 NOTE — NUR
Initial visit; Patient and his son thanked  for visit and offering
God's blessings and encouraging them to contact [facility] and
.

## 2022-02-10 NOTE — NUR
PATIENT AMBULATED WITH WHEELED WALKER FROM HIS ROOM TO TEMP ISOLATION DOORS
NEAR MEDICAL AND BACK TO ROOM (APPROX 250 FEET)

## 2022-02-10 NOTE — NUR
PT RESTING IN BED. PHYYSICIANS ROUNDED SEE COMPUTER FOR NEW ORDERS.
EDUCATED PT AND SON ON ORDERING FOOD FROM ROOM SERVICE. DIET ADVANCED TO
GENERAL. PT TOLERATING PO INTAKE AT THIS TIME.

## 2022-02-10 NOTE — NUR
The patient's RN notified EDUARDO that the patient's attending will be ready to
discharge the patient tomorrow, 2/11.
 
EDUARDO met with the patient and his son, Marques (ph#[phone]), to discuss
discharge plan. The patient discharged to [facility] for a
skilled stay on Friday, 2/4. The patient had a tear in his incision and
was admitted back to the hospital to have it repaired. The patient's primary
care provider is Reinaldo Byers PA-C and his DPOA-HC is in EMR. It designates
his son, German (ph#[phone]). The alternate is his other son, Marques. Marques
reports that the plan is for the patient to return back to [facility] upon discharge tomorrow. He is in agreement to the discharge
tomorrow. EDUARDO notified and faxed updates to Petty at [facility]. EDUARDO to
continue to follow.
 
*Discharge plan: [facility] SN[facility]*

## 2022-02-10 NOTE — NUR
PATIENT DOING WELL TONIGHT. ALERT AND PARTIALLY ORIENTED. SOME CONFUSION NOTED
BUT THIS IS BASELINE. MIDLINE CDI WITH ABD AND TAPE. JUDI DRAIN WITH SMALL
AMOUNT OF BLOODY DRAINAGE NOTED. UROSTOMY CDI TO MORENO BAG WITH CLEAR YELLOW
URINE NOTED. TOOK SCHEDULED PILLS WITH SIPS OF WATER. R CHEMO PORT CDI WITH
BANDAID. SON, DORIE, IS AT BEDSIDE. C/O MODERATE PAIN AND PRN TYLENOL GIVEN X2
DOSES.

## 2022-02-11 VITALS — SYSTOLIC BLOOD PRESSURE: 118 MMHG | TEMPERATURE: 98.1 F | HEART RATE: 96 BPM | DIASTOLIC BLOOD PRESSURE: 71 MMHG

## 2022-02-11 VITALS — HEART RATE: 96 BPM | SYSTOLIC BLOOD PRESSURE: 114 MMHG | TEMPERATURE: 98.2 F | DIASTOLIC BLOOD PRESSURE: 51 MMHG

## 2022-02-11 VITALS — DIASTOLIC BLOOD PRESSURE: 59 MMHG | TEMPERATURE: 97.7 F | SYSTOLIC BLOOD PRESSURE: 124 MMHG | HEART RATE: 77 BPM

## 2022-02-11 VITALS — TEMPERATURE: 97.7 F | SYSTOLIC BLOOD PRESSURE: 124 MMHG | DIASTOLIC BLOOD PRESSURE: 59 MMHG | HEART RATE: 77 BPM

## 2022-02-11 VITALS — HEART RATE: 79 BPM | DIASTOLIC BLOOD PRESSURE: 59 MMHG | SYSTOLIC BLOOD PRESSURE: 119 MMHG

## 2022-02-11 NOTE — NUR
The patient is to discharge today, 2/11, back to [facility]
for a skilled stay. Transportation by private vehicle, via the patient's son.
SW notified [facility]. No additional needs at this time.

## 2022-02-11 NOTE — NUR
son at bedside, assisted patient out of bed and up to recliner for breakfast,
JUDI drain to compression, abdominal dressing CD&I, ileostomy draining yellow
urine with some sediment, ostomy site CD&I and no leaking from site, ready for
breakfst and son will assist